# Patient Record
Sex: MALE | Race: WHITE | NOT HISPANIC OR LATINO | Employment: FULL TIME | URBAN - METROPOLITAN AREA
[De-identification: names, ages, dates, MRNs, and addresses within clinical notes are randomized per-mention and may not be internally consistent; named-entity substitution may affect disease eponyms.]

---

## 2018-07-13 ENCOUNTER — OFFICE VISIT (OUTPATIENT)
Dept: FAMILY MEDICINE CLINIC | Facility: CLINIC | Age: 28
End: 2018-07-13
Payer: COMMERCIAL

## 2018-07-13 VITALS
HEART RATE: 72 BPM | SYSTOLIC BLOOD PRESSURE: 110 MMHG | HEIGHT: 69 IN | WEIGHT: 202 LBS | BODY MASS INDEX: 29.92 KG/M2 | RESPIRATION RATE: 16 BRPM | DIASTOLIC BLOOD PRESSURE: 78 MMHG

## 2018-07-13 DIAGNOSIS — Z13.29 SCREENING FOR THYROID DISORDER: ICD-10-CM

## 2018-07-13 DIAGNOSIS — Z23 NEED FOR DIPHTHERIA-TETANUS-PERTUSSIS (TDAP) VACCINE: ICD-10-CM

## 2018-07-13 DIAGNOSIS — Z00.00 PE (PHYSICAL EXAM), ANNUAL: Primary | ICD-10-CM

## 2018-07-13 DIAGNOSIS — Z13.220 SCREENING, LIPID: ICD-10-CM

## 2018-07-13 DIAGNOSIS — Z13.228 SCREENING FOR METABOLIC DISORDER: ICD-10-CM

## 2018-07-13 DIAGNOSIS — F19.11 INTRAVENOUS DRUG ABUSE IN REMISSION (HCC): ICD-10-CM

## 2018-07-13 DIAGNOSIS — Z13.0 SCREENING, ANEMIA, DEFICIENCY, IRON: ICD-10-CM

## 2018-07-13 PROCEDURE — 99385 PREV VISIT NEW AGE 18-39: CPT | Performed by: NURSE PRACTITIONER

## 2018-07-13 PROCEDURE — 90471 IMMUNIZATION ADMIN: CPT | Performed by: NURSE PRACTITIONER

## 2018-07-13 PROCEDURE — 90715 TDAP VACCINE 7 YRS/> IM: CPT | Performed by: NURSE PRACTITIONER

## 2018-07-13 NOTE — PROGRESS NOTES
Chief Complaint   Patient presents with    Well Check     Pt here for check of 6 years,  no issue     Assessment/Plan:    1  PE (physical exam), annual   we have conducted this for you and have updated your tdap we have given you slips for blood work but ask that you check with your insurance for coverage  rto prn       Subjective:      Patient ID: Aliya Montoya is a 32 y o  male  Here today as a returning pt to establish for PE/  Is recovering IV Drug use clean now for 6 years  Has not been tested for Hep C /HIV since 1 month since being clean  Has no issues        The following portions of the patient's history were reviewed and updated as appropriate: allergies, current medications, past family history, past medical history, past social history, past surgical history and problem list     Past Medical History:   Diagnosis Date    Kidney stone     Substance abuse      Past Surgical History:   Procedure Laterality Date    KIDNEY STONE SURGERY       Family History   Problem Relation Age of Onset    Leukemia Mother     Depression Father     Mental illness Neg Hx      Social History   Social History     Social History    Marital status: Single     Spouse name: N/A    Number of children: N/A    Years of education: N/A     Occupational History    Not on file  Social History Main Topics    Smoking status: Current Some Day Smoker    Smokeless tobacco: Never Used    Alcohol use No    Drug use: No    Sexual activity: Yes     Partners: Female     Other Topics Concern    Not on file     Social History Narrative    No narrative on file     Review of Systems   Constitutional: Negative  HENT: Negative  Eyes: Negative  Respiratory: Negative  Cardiovascular: Negative  Gastrointestinal: Negative  Endocrine: Negative  Genitourinary: Negative  Musculoskeletal: Negative  Skin: Negative  Allergic/Immunologic: Negative  Neurological: Negative  Hematological: Negative  Psychiatric/Behavioral: Negative  Vitals:    07/13/18 1134   BP: 110/78   Pulse: 72   Resp: 16   Weight: 91 6 kg (202 lb)   Height: 5' 9 25" (1 759 m)       Objective: Wt Readings from Last 3 Encounters:   07/13/18 91 6 kg (202 lb)   10/18/12 77 2 kg (170 lb 4 8 oz)     BP Readings from Last 3 Encounters:   07/13/18 110/78   10/18/12 110/72     Pulse Readings from Last 3 Encounters:   07/13/18 72   10/18/12 68     BMI Readings from Last 3 Encounters:   07/13/18 29 62 kg/m²   10/18/12 24 97 kg/m²     No visits with results within 2 Year(s) from this visit  Latest known visit with results is:   Conversion Encounter Outpatient on 01/12/2015   Component Date Value Ref Range Status    Microbiology 01/12/2015 Urine Clean Cat   Final    Comment: Urine Clean Catch (Specimen)  Collected: 01/12/2015 19:43  Status: Final      Last Updated: 01/14/2015 07:21          CULT RSLT URINE (Final)      No Growth [place] Count <1000 CFU/mL  The above 1 analytes were performed by [facility]  [address] [place] [address]      Sodium 01/12/2015 139  135 - 145 mmol/L Final    Potassium 01/12/2015 4 2  3 5 - 5 0 mmol/L Final    Chloride 01/12/2015 104  100 - 108 mmol/L Final    CO2 01/12/2015 31  25 - 33 mmol/L Final    Anion Gap 01/12/2015 4  4 - 13 mmol/L Final    Total Bilirubin 01/12/2015 1 0  0 2 - 1 2 mg/dL Final    Total Protein 01/12/2015 7 4  6 4 - 8 2 g/dL Final    Alkaline Phosphatase 01/12/2015 39* 46 - 116 U/L Final    ALT 01/12/2015 122* 6 - 78 U/L Final    New Reference Range    AST 01/12/2015 47* 7 - 40 U/L Final    Glucose 01/12/2015 74  65 - 140 mg/dL Final    Comment: If patient is fasting, the ADA then defines impaired fasting glucose as  >100 mg/dl and diabetes as  >or equal to 126 mg/dl        Albumin 01/12/2015 4 1  3 5 - 5 0 g/dL Final    BUN 01/12/2015 15  10 - 26 mg/dL Final    Calcium 01/12/2015 9 5  8 3 - 10 1 mg/dL Final    Creatinine 01/12/2015 0 86  0 60 - 1 30 mg/dL Final    Standardized to IDMS reference method    AAGFR 01/12/2015 >60  ml/min/1 73sq m Final    Comment: National Kidney Disease Education Program recommendations are as  follows:  GFR calculation is accurate only with a steady state creatinine  Chronic Kidney disease less than 60 ml/min/1 73 sq  meters  Kidney failure less than 15 ml/min/1 73 sq  meters        NAAGFR 01/12/2015 >60  ml/min/1 73sq m Final    Comment: The above 17 analytes were performed by [facility] [address] WBC, UA 01/12/2015 10-20  /hpf Final    RBC, UA 01/12/2015 Field obscurred  /hpf Final    Field obscurred-unable to enumerate    Epithelial Cells 01/12/2015 None Seen   Final    Bacteria, UA 01/12/2015 None Seen   Final    Comment: The above 4 analytes were performed by [facility] [place] [place] [address]      Color, UA 01/12/2015 Red   Final    Clarity, UA 01/12/2015 Turbid   Final    Bilirubin, UA 01/12/2015 Negative  Negative Final    Specific Gravity, UA 01/12/2015 1 020  1 003 - 1 035 Final    Blood, UA 01/12/2015 Large* Negative Final    pH, UA 01/12/2015 7 0  5 0 - 8 0 Final    Urobilinogen, UA 01/12/2015 1 0  0 2,1 0 E U /dl Final    Nitrite, UA 01/12/2015 Positive* Negative Final    Leukocytes, UA 01/12/2015 Moderate* Negative Final    Comment: The above 12 analytes were performed by [facility] [place] [place] [address]      Glucose, UA 01/12/2015 Negative  Negative mg/dL Final    Ketones, UA 01/12/2015 Trace* Negative mg/dL Final    Protein, UA 01/12/2015 >=300(3+)* Negative mg/dL Final    WBC 01/12/2015 9 74  4 31 - 10 16 Thousand/uL Final    RBC 01/12/2015 4 69  3 88 - 5 62 Million/uL Final    Hemoglobin 01/12/2015 15 4  12 0 - 17 0 g/dL Final    Hematocrit 01/12/2015 42 1  36 5 - 49 3 % Final    MCV 01/12/2015 90  82 - 98 fL Final    MCH 01/12/2015 32 8  26 8 - 34 3 pg Final    MCHC 01/12/2015 36 6  31 4 - 37 4 g/dL Final    RDW 01/12/2015 12 0  11 6 - 15 1 % Final    Platelets 85/09/8112 197  149 - 390 Thousand/uL Final    Neutrophils Relative 01/12/2015 63  43 - 75 % Final    Lymphocytes Relative 01/12/2015 28  14 - 44 % Final    Monocytes Relative 01/12/2015 8  4 - 12 % Final    Eosinophils Relative 01/12/2015 1  0 - 6 % Final    Neutrophils Absolute 01/12/2015 6 09  1 85 - 7 62 Thousand/uL Final    Lymphocytes Absolute 01/12/2015 2 74  0 60 - 4 47 Thousand/uL Final    Monocytes Absolute 01/12/2015 0 80  0 17 - 1 22 Thousand/uL Final    Eosinophils Absolute 01/12/2015 0 09  0 00 - 0 61 Thousand/uL Final    Basophils Absolute 01/12/2015 0 02  0 00 - 0 10 Thousand/uL Final    Comment: The above 20 analytes were performed by [facility]  [address] [place] [address]      MPV 01/12/2015 10 7  8 9 - 12 7 fL Final          Physical Exam   Constitutional: He is oriented to person, place, and time  He appears well-developed and well-nourished  HENT:   Head: Normocephalic and atraumatic  Mouth/Throat: Oropharynx is clear and moist    Eyes: Pupils are equal, round, and reactive to light  Neck: Normal range of motion  Neck supple  Cardiovascular: Normal rate, regular rhythm, normal heart sounds and intact distal pulses  Pulmonary/Chest: Effort normal    Abdominal: Soft  Musculoskeletal: Normal range of motion  Neurological: He is alert and oriented to person, place, and time  Skin: Skin is warm and dry  Psychiatric: He has a normal mood and affect   His behavior is normal  Judgment and thought content normal

## 2018-07-24 ENCOUNTER — TELEPHONE (OUTPATIENT)
Dept: FAMILY MEDICINE CLINIC | Facility: CLINIC | Age: 28
End: 2018-07-24

## 2018-07-24 NOTE — TELEPHONE ENCOUNTER
Joaquim Burkitt called  He e-mailed you his labs because they told him they are not in network  He would like you to review and let him know the results

## 2018-08-07 DIAGNOSIS — B19.20 HEPATITIS C VIRUS INFECTION WITHOUT HEPATIC COMA, UNSPECIFIED CHRONICITY: Primary | ICD-10-CM

## 2019-08-28 ENCOUNTER — OFFICE VISIT (OUTPATIENT)
Dept: FAMILY MEDICINE CLINIC | Facility: CLINIC | Age: 29
End: 2019-08-28
Payer: COMMERCIAL

## 2019-08-28 ENCOUNTER — HOSPITAL ENCOUNTER (OUTPATIENT)
Dept: RADIOLOGY | Facility: HOSPITAL | Age: 29
Discharge: HOME | End: 2019-08-28
Payer: COMMERCIAL

## 2019-08-28 ENCOUNTER — TELEPHONE (OUTPATIENT)
Dept: UROLOGY | Facility: MEDICAL CENTER | Age: 29
End: 2019-08-28

## 2019-08-28 VITALS
HEART RATE: 70 BPM | DIASTOLIC BLOOD PRESSURE: 84 MMHG | RESPIRATION RATE: 18 BRPM | SYSTOLIC BLOOD PRESSURE: 122 MMHG | WEIGHT: 190.3 LBS | BODY MASS INDEX: 28.19 KG/M2 | TEMPERATURE: 98.4 F | HEIGHT: 69 IN

## 2019-08-28 DIAGNOSIS — R10.9 FLANK PAIN: Primary | ICD-10-CM

## 2019-08-28 DIAGNOSIS — R31.9 HEMATURIA, UNSPECIFIED TYPE: ICD-10-CM

## 2019-08-28 DIAGNOSIS — R50.9 FEVER, UNSPECIFIED FEVER CAUSE: ICD-10-CM

## 2019-08-28 DIAGNOSIS — R10.9 FLANK PAIN: ICD-10-CM

## 2019-08-28 LAB
SL AMB  POCT GLUCOSE, UA: NEGATIVE
SL AMB LEUKOCYTE ESTERASE,UA: NEGATIVE
SL AMB POCT BILIRUBIN,UA: NEGATIVE
SL AMB POCT BLOOD,UA: ABNORMAL
SL AMB POCT CLARITY,UA: ABNORMAL
SL AMB POCT COLOR,UA: YELLOW
SL AMB POCT KETONES,UA: NEGATIVE
SL AMB POCT NITRITE,UA: NEGATIVE
SL AMB POCT PH,UA: 5
SL AMB POCT SPECIFIC GRAVITY,UA: 1.01
SL AMB POCT URINE PROTEIN: NEGATIVE
SL AMB POCT UROBILINOGEN: 0.2

## 2019-08-28 PROCEDURE — 81002 URINALYSIS NONAUTO W/O SCOPE: CPT | Performed by: PHYSICIAN ASSISTANT

## 2019-08-28 PROCEDURE — 99214 OFFICE O/P EST MOD 30 MIN: CPT | Performed by: PHYSICIAN ASSISTANT

## 2019-08-28 PROCEDURE — 74176 CT ABD & PELVIS W/O CONTRAST: CPT

## 2019-08-28 RX ORDER — CIPROFLOXACIN 500 MG/1
500 TABLET, FILM COATED ORAL EVERY 12 HOURS SCHEDULED
Qty: 20 TABLET | Refills: 0 | Status: SHIPPED | OUTPATIENT
Start: 2019-08-28 | End: 2019-09-07

## 2019-08-28 NOTE — TELEPHONE ENCOUNTER
Called Tanya Dietrich and scheduled in [facility] with Dr Barbour Current tomorrow @ 11:00, emailed new patient paperwork and directions to [email or web address]

## 2019-08-28 NOTE — TELEPHONE ENCOUNTER
Please Triage - New Patient  Urgent Appt Needed-[facility]    What is the reason for the patients appointment? [id number] Kidney Stone - PCP wants him to be seen ASAP    Do we accept the patient's insurance or is the patient Self-Pay? [facility]- referral being sent      Has the patient had any previous urologist(s)? No    Has the patients records been requested?  no    Has the patient had any outside testing done? [facility] [place]    What is the patients location preference for an office visit? [place]    Does the patient have a personal history of cancer?   No    Patient can be reached at [phone]

## 2019-08-28 NOTE — PROGRESS NOTES
Assessment/Plan:    1  Flank pain    - will do CT stone protocol STAT, if no stone consider prostatitis will send urine for culture, start cipro 1 tab twice daily and follow up with patient in 1 week  - POCT urine dip  - CT renal stone study abdomen pelvis wo contrast; Future  - Urine culture    2  Hematuria, unspecified type    - as above  - POCT urine dip  - CT renal stone study abdomen pelvis wo contrast; Future  - Urine culture    3  Fever, unspecified fever cause    - stone or stone with infection? Will do CT, send urine for culture, if no stone will start cipro twice daily  - POCT urine dip  - CT renal stone study abdomen pelvis wo contrast; Future  - Urine culture    4  BMI 28 0-28 9,adult    - encouraged patient to work on [employer], exercise  and adequate sleep for weight loss  Follow up if more help is needed    F/u as needed    Subjective:   Chief Complaint   Patient presents with    Fever     102 8 on monday- and last night     Abdominal Pain     for the past 4 days    polyruria     for the past week       Patient ID: Kofi Calabrese is a 25 y o  male  Past 3 days of fever 100-102 8, urinary frequency, lower abdominal pressure comes and goes  With laying flat will get a shooting pain above hip  Has been waking up at least twice to urinate, very uncommon for him  Moving bowels a little looser then normal, normal appetite  Went to Urgent Care Monday night because of the fever, they noted blood in his urine, they sent patient to [address] [place] [address] who gave patient motrin and sent him home  Last kidney 2015         The following portions of the patient's history were reviewed and updated as appropriate: allergies, current medications, past family history, past medical history, past social history, past surgical history and problem list     Past Medical History:   Diagnosis Date    Kidney stone     Substance abuse ([facility] [address] )      Past Surgical History:   Procedure Laterality Date    KIDNEY STONE SURGERY Family History   Problem Relation Age of Onset   Benson Clemons Leukemia Mother     Depression Father     Mental illness Neg Hx      Social History     Socioeconomic History    Marital status: Single     Spouse name: Not on file    Number of children: Not on file    Years of education: Not on file    Highest education level: Not on file   Occupational History    Not on file   Social Needs    Financial resource strain: Not on file    Food insecurity:     Worry: Not on file     Inability: Not on file    Transportation needs:     Medical: Not on file     Non-medical: Not on file   Tobacco Use    Smoking status: Current Some Day Smoker    Smokeless tobacco: Never Used   Substance and Sexual Activity    Alcohol use: No    Drug use: No    Sexual activity: Yes     Partners: Female   Lifestyle    Physical activity:     Days per week: Not on file     Minutes per session: Not on file    Stress: Not on file   Relationships    Social connections:     Talks on phone: Not on file     Gets together: Not on file     Attends Restorationism service: Not on file     Active member of club or organization: Not on file     Attends meetings of clubs or organizations: Not on file     Relationship status: Not on file    Intimate partner violence:     Fear of current or ex partner: Not on file     Emotionally abused: Not on file     Physically abused: Not on file     Forced sexual activity: Not on file   Other Topics Concern    Not on file   Social History Narrative    Not on file     No current outpatient medications on file  Review of Systems          Objective:    Vitals:    08/28/19 1115   BP: 122/84   BP Location: Left arm   Patient Position: Sitting   Cuff Size: Large   Pulse: 70   Resp: 18   Temp: 98 4 °F (36 9 °C)   TempSrc: Oral   Weight: 86 3 kg (190 lb 4 8 oz)   Height: 5' 9" (1 753 m)        Physical Exam   Constitutional: He is oriented to person, place, and time  He appears well-developed and well-nourished  Cardiovascular: Normal rate, regular rhythm and normal heart sounds  Pulmonary/Chest: Breath sounds normal    Abdominal: Soft  Normal appearance and bowel sounds are normal  There is tenderness in the suprapubic area  There is no rigidity, no rebound, no guarding, no CVA tenderness and no tenderness at McBurney's point  Neurological: He is alert and oriented to person, place, and time  Skin: Skin is warm  Psychiatric: He has a normal mood and affect  His behavior is normal          Office Visit on 08/28/2019   Component Date Value Ref Range Status    LEUKOCYTE ESTERASE,UA 08/28/2019 negative   Final    NITRITE,UA 08/28/2019 negative   Final    SL AMB POCT UROBILINOGEN 08/28/2019 0 2   Final    POCT URINE PROTEIN 08/28/2019 negative   Final     PH,UA 08/28/2019 5 0   Final    BLOOD,UA 08/28/2019 large   Final    SPECIFIC GRAVITY,UA 08/28/2019 1 015   Final    KETONES,UA 08/28/2019 negative   Final    BILIRUBIN,UA 08/28/2019 negative   Final    GLUCOSE, UA 08/28/2019 negative   Final     COLOR,UA 08/28/2019 yellow   Final    CLARITY,UA 08/28/2019 cloudy   Final   ]    BMI Counseling: Body mass index is 28 1 kg/m²  Discussed the patient's BMI with him  The BMI is above average  BMI counseling and education was provided to the patient  Nutrition recommendations include reducing portion sizes, decreasing overall calorie intake and 3-5 servings of fruits/vegetables daily  Exercise recommendations include moderate aerobic physical activity for 150 minutes/week

## 2019-08-29 ENCOUNTER — OFFICE VISIT (OUTPATIENT)
Dept: UROLOGY | Facility: CLINIC | Age: 29
End: 2019-08-29
Payer: COMMERCIAL

## 2019-08-29 ENCOUNTER — TELEPHONE (OUTPATIENT)
Dept: FAMILY MEDICINE CLINIC | Facility: CLINIC | Age: 29
End: 2019-08-29

## 2019-08-29 VITALS
DIASTOLIC BLOOD PRESSURE: 76 MMHG | HEART RATE: 80 BPM | BODY MASS INDEX: 28.53 KG/M2 | SYSTOLIC BLOOD PRESSURE: 124 MMHG | HEIGHT: 69 IN | WEIGHT: 192.6 LBS

## 2019-08-29 DIAGNOSIS — N20.0 CALCULUS OF KIDNEY: Primary | ICD-10-CM

## 2019-08-29 PROCEDURE — 99244 OFF/OP CNSLTJ NEW/EST MOD 40: CPT | Performed by: UROLOGY

## 2019-08-29 NOTE — PROGRESS NOTES
8/29/2019    Dex Epp Host  1990  [id number]        Assessment  Nephrolithiasis  Possible UTI/prostatitis    Plan  We discussed that his nephrolithiasis is nonobstructing and not the cause of his current symptoms  Fortunately symptoms have mostly resolved  He will continue his antibiotic therapy and await urine culture results which hopefully will be back later today or tomorrow  We are requesting urologic records including operative report, offices, and stone analysis  He may not be a candidate for ESWL depending on stone composition  There is no indication however for urgent intervention, especially with the possibility of infection actively  Will allow for resolution of current symptoms and follow up in about 1 month with KUB  Hopefully by then we will have his records  All questions answered to his satisfaction and he agrees with the plan  Patient wishes to follow up in the [facility] office  History of Present Illness  [facility] BEATRICE is a 25 y o  male with history of bilateral kidney stones treated in 1 in [place]  At the time he had bilateral stents placed and underwent shockwave lithotripsy on 2 occasions that did not work    He then underwent ureteroscopy  He was not informed of his stone analysis  Recently, he developed right lower quadrant pain and fever 102°  He also complained of abnormal urination with urinary urgency frequency and dysuria  This lasted for a few days  He went to emergency department and then to his primary care provider  Stat CT scan was performed which revealed large left lower pole nonobstructing calculus with possible fragments, no hydronephrosis or stranding  PCP placed him on Cipro and culture was sent  We reviewed the CT images in the office today  Unfortunately, urine culture still pending          AUA SYMPTOM SCORE      Most Recent Value   AUA SYMPTOM SCORE   How often have you had a sensation of not emptying your bladder completely after you finished urinating? 3   How often have you had to urinate again less than two hours after you finished urinating? 4   How often have you found you stopped and started again several times when you urinate? 2   How often have you found it difficult to postpone urination? 1   How often have you had a weak urinary stream?  4   How often have you had to push or strain to begin urination? 2   How many times did you most typically get up to urinate from the time you went to bed at night until the time you got up in the morning? 2   Quality of Life: If you were to spend the rest of your life with your urinary condition just the way it is now, how would you feel about that?  5   AUA SYMPTOM SCORE  18          Review of Systems  Review of Systems   Constitutional: Negative  HENT: Negative  Respiratory: Negative  Cardiovascular: Negative  Gastrointestinal: Negative  Genitourinary:        As per HPI   Musculoskeletal: Negative  Skin: Negative  Neurological: Negative  Hematological: Negative            Past Medical History  Past Medical History:   Diagnosis Date    Kidney stone     Substance abuse ([facility] [address] )        Past Social History  Past Surgical History:   Procedure Laterality Date    KIDNEY STONE SURGERY         Past Family History  Family History   Problem Relation Age of Onset    Leukemia Mother     Depression Father     Substance Abuse Neg Hx        Past Social history  Social History     Socioeconomic History    Marital status: Single     Spouse name: Not on file    Number of children: Not on file    Years of education: Not on file    Highest education level: Not on file   Occupational History    Not on file   Social Needs    Financial resource strain: Not on file    Food insecurity:     Worry: Not on file     Inability: Not on file    Transportation needs:     Medical: Not on file     Non-medical: Not on file   Tobacco Use    Smoking status: Former Smoker    Smokeless tobacco: Never Used   Substance and Sexual Activity    Alcohol use: No    Drug use: No    Sexual activity: Yes     Partners: Female   Lifestyle    Physical activity:     Days per week: Not on file     Minutes per session: Not on file    Stress: Not on file   Relationships    Social connections:     Talks on phone: Not on file     Gets together: Not on file     Attends Religion service: Not on file     Active member of club or organization: Not on file     Attends meetings of clubs or organizations: Not on file     Relationship status: Not on file    Intimate partner violence:     Fear of current or ex partner: Not on file     Emotionally abused: Not on file     Physically abused: Not on file     Forced sexual activity: Not on file   Other Topics Concern    Not on file   Social History Narrative    Not on file     Social History     Tobacco Use   Smoking Status Former Smoker   Smokeless Tobacco Never Used       Current Medications  Current Outpatient Medications   Medication Sig Dispense Refill    ciprofloxacin (CIPRO) 500 mg tablet Take 1 tablet (500 mg total) by mouth every 12 (twelve) hours for 10 days 20 tablet 0     No current facility-administered medications for this visit  Allergies  No Known Allergies    Past Medical History, Social History, Family History, medications and allergies were reviewed  Vitals  Vitals:    08/29/19 1102   BP: 124/76   BP Location: Left arm   Patient Position: Sitting   Cuff Size: Adult   Pulse: 80   Weight: 87 4 kg (192 lb 9 6 oz)   Height: 5' 9" (1 753 m)       Physical Exam  Physical Exam   Constitutional: He is oriented to person, place, and time  He appears well-developed and well-nourished  Cardiovascular: Normal rate  Pulmonary/Chest: Effort normal    Abdominal: Soft  Genitourinary:   Genitourinary Comments: No CVA tenderness  Musculoskeletal: Normal range of motion  Neurological: He is alert and oriented to person, place, and time     Skin: Skin is warm, dry and intact  Psychiatric: He has a normal mood and affect  Vitals reviewed          Results  No results found for: PSA  Lab Results   Component Value Date    GLUCOSE 74 01/12/2015    CALCIUM 9 5 01/12/2015     01/12/2015    K 4 2 01/12/2015    CO2 31 01/12/2015     01/12/2015    BUN 15 01/12/2015    CREATININE 0 86 01/12/2015     Lab Results   Component Value Date    WBC 9 74 01/12/2015    HGB 15 4 01/12/2015    HCT 42 1 01/12/2015    MCV 90 01/12/2015     01/12/2015

## 2019-08-29 NOTE — TELEPHONE ENCOUNTER
Eugenia from the prior [facility] department called stating where the pt went yesterday to get the CT, was not covered by his insurance  Eugenia would need you to do a peer to peer stating why he went to [facility] and not somewhere in [facility]  Eugenia is aware that there are two cards scanned into the chart, one with [facility] on the card and another with our practice  When [facility] first started the [address] Jennifer did not have the new card yet so the peer to peer is required  If you have any questions Eugenia would like you to call her at [phone]      Peer to Peer number: [phone]

## 2019-08-30 LAB
BACTERIA UR CULT: NORMAL
Lab: NO GROWTH

## 2019-09-05 NOTE — TELEPHONE ENCOUNTER
Spoke with MATTHEW, apparently it is a medical issue and he needs to call the number on the back of his card now

## 2019-09-09 NOTE — TELEPHONE ENCOUNTER
Its okay  Thank you for letting me know anyway cause the pt was confused to what he had to do when I talked to him the other day

## 2019-09-09 NOTE — TELEPHONE ENCOUNTER
I called [facility] and she stated since the insurance card had the wrong PCP on it they needed you to do a peer to peer  But since the card now has our office on it, the CT was approved  I guess it was just an issue because the card did not have our name on it

## 2019-09-09 NOTE — TELEPHONE ENCOUNTER
I am sorry I meant to complete that not send it back to you, I was talking directly with Eugenia  I apologize

## 2019-09-17 ENCOUNTER — TELEPHONE (OUTPATIENT)
Dept: UROLOGY | Facility: AMBULATORY SURGERY CENTER | Age: 29
End: 2019-09-17

## 2019-09-17 NOTE — TELEPHONE ENCOUNTER
Patient is scheduled to be seen on 10/1, and the patient is in need of a insurance referral for the visit  Thanks

## 2019-09-17 NOTE — TELEPHONE ENCOUNTER
There is a referral that is currently valid for Urology until 11/25/19  I printed from [address] [place] [address], scanned into patient's chart, entered into epic, and assigned to appointment

## 2020-12-22 ENCOUNTER — OFFICE VISIT (OUTPATIENT)
Dept: FAMILY MEDICINE CLINIC | Facility: CLINIC | Age: 30
End: 2020-12-22
Payer: COMMERCIAL

## 2020-12-22 VITALS
DIASTOLIC BLOOD PRESSURE: 80 MMHG | BODY MASS INDEX: 28.14 KG/M2 | HEART RATE: 96 BPM | HEIGHT: 69 IN | WEIGHT: 190 LBS | SYSTOLIC BLOOD PRESSURE: 110 MMHG | RESPIRATION RATE: 14 BRPM

## 2020-12-22 DIAGNOSIS — M54.41 ACUTE MIDLINE LOW BACK PAIN WITH RIGHT-SIDED SCIATICA: Primary | ICD-10-CM

## 2020-12-22 PROCEDURE — 1036F TOBACCO NON-USER: CPT | Performed by: NURSE PRACTITIONER

## 2020-12-22 PROCEDURE — 3725F SCREEN DEPRESSION PERFORMED: CPT | Performed by: NURSE PRACTITIONER

## 2020-12-22 PROCEDURE — 3008F BODY MASS INDEX DOCD: CPT | Performed by: NURSE PRACTITIONER

## 2020-12-22 PROCEDURE — 99213 OFFICE O/P EST LOW 20 MIN: CPT | Performed by: NURSE PRACTITIONER

## 2020-12-22 RX ORDER — PREDNISONE 10 MG/1
TABLET ORAL
Qty: 30 TABLET | Refills: 0 | Status: SHIPPED | OUTPATIENT
Start: 2020-12-22 | End: 2021-03-29 | Stop reason: ALTCHOICE

## 2020-12-22 RX ORDER — METHOCARBAMOL 500 MG/1
500 TABLET, FILM COATED ORAL 3 TIMES DAILY PRN
Qty: 30 TABLET | Refills: 0 | Status: SHIPPED | OUTPATIENT
Start: 2020-12-22 | End: 2021-03-29 | Stop reason: ALTCHOICE

## 2020-12-26 ENCOUNTER — NURSE TRIAGE (OUTPATIENT)
Dept: OTHER | Facility: OTHER | Age: 30
End: 2020-12-26

## 2021-03-29 ENCOUNTER — OFFICE VISIT (OUTPATIENT)
Dept: FAMILY MEDICINE CLINIC | Facility: CLINIC | Age: 31
End: 2021-03-29
Payer: COMMERCIAL

## 2021-03-29 VITALS
HEART RATE: 84 BPM | HEIGHT: 70 IN | BODY MASS INDEX: 29.49 KG/M2 | RESPIRATION RATE: 16 BRPM | WEIGHT: 206 LBS | DIASTOLIC BLOOD PRESSURE: 80 MMHG | TEMPERATURE: 98.3 F | SYSTOLIC BLOOD PRESSURE: 122 MMHG

## 2021-03-29 DIAGNOSIS — R53.83 FATIGUE, UNSPECIFIED TYPE: ICD-10-CM

## 2021-03-29 DIAGNOSIS — R23.8 EASY BRUISING: ICD-10-CM

## 2021-03-29 DIAGNOSIS — Z00.00 ANNUAL PHYSICAL EXAM: Primary | ICD-10-CM

## 2021-03-29 DIAGNOSIS — R19.7 DIARRHEA, UNSPECIFIED TYPE: ICD-10-CM

## 2021-03-29 DIAGNOSIS — H69.83 DYSFUNCTION OF BOTH EUSTACHIAN TUBES: ICD-10-CM

## 2021-03-29 DIAGNOSIS — Z13.220 SCREENING FOR LIPID DISORDERS: ICD-10-CM

## 2021-03-29 DIAGNOSIS — Z13.89 SCREENING FOR BLOOD OR PROTEIN IN URINE: ICD-10-CM

## 2021-03-29 DIAGNOSIS — Z11.4 SCREENING FOR HIV (HUMAN IMMUNODEFICIENCY VIRUS): ICD-10-CM

## 2021-03-29 PROCEDURE — 99395 PREV VISIT EST AGE 18-39: CPT | Performed by: NURSE PRACTITIONER

## 2021-03-29 PROCEDURE — 1036F TOBACCO NON-USER: CPT | Performed by: NURSE PRACTITIONER

## 2021-03-29 PROCEDURE — 3008F BODY MASS INDEX DOCD: CPT | Performed by: NURSE PRACTITIONER

## 2021-03-29 PROCEDURE — 3725F SCREEN DEPRESSION PERFORMED: CPT | Performed by: NURSE PRACTITIONER

## 2021-03-29 PROCEDURE — 99213 OFFICE O/P EST LOW 20 MIN: CPT | Performed by: NURSE PRACTITIONER

## 2021-03-29 RX ORDER — DIPHENOXYLATE HYDROCHLORIDE AND ATROPINE SULFATE 2.5; .025 MG/1; MG/1
1 TABLET ORAL DAILY
COMMUNITY
End: 2021-06-17 | Stop reason: ALTCHOICE

## 2021-03-29 NOTE — PATIENT INSTRUCTIONS
Central Scheduling: [phone] for ENT referral scheduling  Try Imodium over the counter for diarrhea  If this does not help, please let me know  We will contact you with lab results once final       Wellness Visit for Adults   AMBULATORY CARE:   A wellness visit  is when you see your healthcare provider to get screened for health problems  Your healthcare provider will also give you advice on how to stay healthy  Write down your questions so you remember to ask them  Ask your healthcare provider how often you should have a wellness visit  What happens at a wellness visit:  Your healthcare provider will ask about your health, and your family history of health problems  This includes high blood pressure, heart disease, and cancer  He or she will ask if you have symptoms that concern you, if you smoke, and about your mood  You may also be asked about your intake of medicines, supplements, food, and alcohol  Any of the following may be done:  · Your weight  will be checked  Your height may also be checked so your body mass index (BMI) can be calculated  Your BMI shows if you are at a healthy weight  · Your blood pressure  and heart rate will be checked  Your temperature may also be checked  · Blood and urine tests  may be done  Blood tests may be done to check your cholesterol levels  Abnormal cholesterol levels increase your risk for heart disease and stroke  You may also need a blood or urine test to check for diabetes if you are at increased risk  Urine tests may be done to look for signs of an infection or kidney disease  · A physical exam  includes checking your heartbeat and lungs with a stethoscope  Your healthcare provider may also check your skin to look for sun damage  · Screening tests  may be recommended  A screening test is done to check for diseases that may not cause symptoms  The screening tests you may need depend on your age, gender, family history, and lifestyle habits   For example, colorectal screening may be recommended if you are 48years old or older  Screening tests you need if you are a woman:   · A Pap smear  is used to screen for cervical cancer  Pap smears are usually done every 3 to 5 years depending on your age  You may need them more often if you have had abnormal Pap smear test results in the past  Ask your healthcare provider how often you should have a Pap smear  · A mammogram  is an x-ray of your breasts to screen for breast cancer  Experts recommend mammograms every 2 years starting at age 48 years  You may need a mammogram at age 52 years or younger if you have an increased risk for breast cancer  Talk to your healthcare provider about when you should start having mammograms and how often you need them  Vaccines you may need:   · Get an influenza vaccine  every year  The influenza vaccine protects you from the flu  Several types of viruses cause the flu  The viruses change over time, so new vaccines are made each year  · Get a tetanus-diphtheria (Td) booster vaccine  every 10 years  This vaccine protects you against tetanus and diphtheria  Tetanus is a severe infection that may cause painful muscle spasms and lockjaw  Diphtheria is a severe bacterial infection that causes a thick covering in the back of your mouth and throat  · Get a human papillomavirus (HPV) vaccine  if you are female and aged 23 to 32 or male 23 to 24 and never received it  This vaccine protects you from HPV infection  HPV is the most common infection spread by sexual contact  HPV may also cause vaginal, penile, and anal cancers  · Get a pneumococcal vaccine  if you are aged 72 years or older  The pneumococcal vaccine is an injection given to protect you from pneumococcal disease  Pneumococcal disease is an infection caused by pneumococcal bacteria  The infection may cause pneumonia, meningitis, or an ear infection      · Get a shingles vaccine  if you are 60 or older, even if you have had shingles before  The shingles vaccine is an injection to protect you from the varicella-zoster virus  This is the same virus that causes chickenpox  Shingles is a painful rash that develops in people who had chickenpox or have been exposed to the virus  How to eat healthy:  My Plate is a model for planning healthy meals  It shows the types and amounts of foods that should go on your plate  Fruits and vegetables make up about half of your plate, and grains and protein make up the other half  A serving of dairy is included on the side of your plate  The amount of calories and serving sizes you need depends on your age, gender, weight, and height  Examples of healthy foods are listed below:  · Eat a variety of vegetables  such as dark green, red, and orange vegetables  You can also include canned vegetables low in sodium (salt) and frozen vegetables without added butter or sauces  · Eat a variety of fresh fruits , canned fruit in 100% juice, frozen fruit, and dried fruit  · Include whole grains  At least half of the grains you eat should be whole grains  Examples include whole-wheat bread, wheat pasta, brown rice, and whole-grain cereals such as oatmeal     · Eat a variety of protein foods such as seafood (fish and shellfish), lean meat, and poultry without skin (turkey and chicken)  Examples of lean meats include pork leg, shoulder, or tenderloin, and beef round, sirloin, tenderloin, and extra lean ground beef  Other protein foods include eggs and egg substitutes, beans, peas, soy products, nuts, and seeds  · Choose low-fat dairy products such as skim or 1% milk or low-fat yogurt, cheese, and cottage cheese  · Limit unhealthy fats  such as butter, hard margarine, and shortening  Exercise:  Exercise at least 30 minutes per day on most days of the week  Some examples of exercise include walking, biking, dancing, and swimming   You can also fit in more physical activity by taking the stairs instead of the elevator or parking farther away from stores  Include muscle strengthening activities 2 days each week  Regular exercise provides many health benefits  It helps you manage your weight, and decreases your risk for type 2 diabetes, heart disease, stroke, and high blood pressure  Exercise can also help improve your mood  Ask your healthcare provider about the best exercise plan for you  General health and safety guidelines:   · Do not smoke  Nicotine and other chemicals in cigarettes and cigars can cause lung damage  Ask your healthcare provider for information if you currently smoke and need help to quit  E-cigarettes or smokeless tobacco still contain nicotine  Talk to your healthcare provider before you use these products  · Limit alcohol  A drink of alcohol is 12 ounces of beer, 5 ounces of wine, or 1½ ounces of liquor  · Lose weight, if needed  Being overweight increases your risk of certain health conditions  These include heart disease, high blood pressure, type 2 diabetes, and certain types of cancer  · Protect your skin  Do not sunbathe or use tanning beds  Use sunscreen with a SPF 15 or higher  Apply sunscreen at least 15 minutes before you go outside  Reapply sunscreen every 2 hours  Wear protective clothing, hats, and sunglasses when you are outside  · Drive safely  Always wear your seatbelt  Make sure everyone in your car wears a seatbelt  A seatbelt can save your life if you are in an accident  Do not use your cell phone when you are driving  This could distract you and cause an accident  Pull over if you need to make a call or send a text message  · Practice safe sex  Use latex condoms if are sexually active and have more than one partner  Your healthcare provider may recommend screening tests for sexually transmitted infections (STIs)  · Wear helmets, lifejackets, and protective gear    Always wear a helmet when you ride a bike or motorcycle, go skiing, or play sports that could cause a head injury  Wear protective equipment when you play sports  Wear a lifejacket when you are on a boat or doing water sports  © Copyright [address] Information is for End User's use only and may not be sold, redistributed or otherwise used for commercial purposes  All illustrations and images included in CareNotes® are the copyrighted property of [employer]  or [address] [place], [place] [address]shantanu joey   The above information is an  only  It is not intended as medical advice for individual conditions or treatments  Talk to your doctor, nurse or pharmacist before following any medical regimen to see if it is safe and effective for you

## 2021-03-29 NOTE — PROGRESS NOTES
ADULT ANNUAL PHYSICAL  [facility]    NAME: Douglas Russell Host  AGE: 26 y o  SEX: male  : 1990     DATE: 3/29/2021     Assessment and Plan:  1  Obtain fasting labs when able  2  Immunizations UTD  He can get covid vaccine once available  3  F/u in 1 year for annual physical      Problem List Items Addressed This Visit     None      Visit Diagnoses     Annual physical exam    -  Primary    BMI 29 0-29 9,adult        Fatigue, unspecified type        Relevant Orders    CBC and differential    Comprehensive metabolic panel    Ferritin    TSH, 3rd generation with Free T4 reflex    Iron    Screening for blood or protein in urine        Relevant Orders    UA (URINE) with reflex to Scope    Screening for lipid disorders        Relevant Orders    Lipid Panel with Direct LDL reflex    Screening for HIV (human immunodeficiency virus)        Relevant Orders    HIV-1 RNA, quantitative, PCR    Dysfunction of both eustachian tubes        Relevant Orders    Ambulatory Referral to Otolaryngology    Easy bruising        Relevant Orders    CBC and differential    Ferritin    Iron    Protime-INR          Immunizations and preventive care screenings were discussed with patient today  Appropriate education was printed on patient's after visit summary  Counseling:  Alcohol/drug use: discussed moderation in alcohol intake, the recommendations for healthy alcohol use, and avoidance of illicit drug use  Dental Health: discussed importance of regular tooth brushing, flossing, and dental visits  Injury prevention: discussed safety/seat belts, safety helmets, smoke detectors, carbon dioxide detectors, and smoking near bedding or upholstery  Sexual health: discussed sexually transmitted diseases, partner selection, use of condoms, avoidance of unintended pregnancy, and contraceptive alternatives    · Exercise: the importance of regular exercise/physical activity was discussed  Recommend exercise 3-5 times per week for at least 30 minutes  BMI Counseling: Body mass index is 29 98 kg/m²  The BMI is above normal  Nutrition recommendations include decreasing portion sizes, encouraging healthy choices of fruits and vegetables, decreasing fast food intake, moderation in carbohydrate intake, increasing intake of lean protein and reducing intake of saturated and trans fat  Exercise recommendations include moderate physical activity 150 minutes/week  No pharmacotherapy was ordered  Return in about 1 year (around 3/29/2022) for Annual physical      Chief Complaint:     Chief Complaint   Patient presents with    Bleeding/Bruising      History of Present Illness:     Adult Annual Physical   Patient here for a comprehensive physical exam  The patient reports no problems  Diet and Physical Activity  · Diet/Nutrition: well balanced diet, consuming 3-5 servings of fruits/vegetables daily and adequate fiber intake  · Exercise: moderate cardiovascular exercise and 3-4 times a week on average  Depression Screening  PHQ-9 Depression Screening    PHQ-9:   Frequency of the following problems over the past two weeks:      Little interest or pleasure in doing things: 0 - not at all  Feeling down, depressed, or hopeless: 0 - not at all  Trouble falling or staying asleep, or sleeping too much: 0 - not at all  Feeling tired or having little energy: 0 - not at all  Poor appetite or overeatin - not at all  Feeling bad about yourself - or that you are a failure or have let yourself or your family down: 0 - not at all  Trouble concentrating on things, such as reading the newspaper or watching television: 0 - not at all  Moving or speaking so slowly that other people could have noticed   Or the opposite - being so fidgety or restless that you have been moving around a lot more than usual: 0 - not at all  Thoughts that you would be better off dead, or of hurting yourself in some way: 0 - not at all  PHQ-2 Score: 0  PHQ-9 Score: 0       General Health  · Sleep: sleeps well and gets 4-6 hours of sleep on average  · Hearing: normal - bilateral   · Vision: no vision problems and most recent eye exam >1 year ago  · Dental: regular dental visits, brushes teeth twice daily and flosses teeth occasionally   Health  · History of STDs?: no      Review of Systems:     Review of Systems   Constitutional: Negative for chills and fever  HENT: Negative  Negative for ear pain and sore throat  Eyes: Negative  Negative for pain and visual disturbance  Respiratory: Negative  Negative for cough and shortness of breath  Cardiovascular: Negative  Negative for chest pain, palpitations and leg swelling  Gastrointestinal: Positive for diarrhea  Negative for abdominal distention, abdominal pain, blood in stool, constipation, nausea and vomiting  Genitourinary: Negative  Negative for difficulty urinating, dysuria and hematuria  Musculoskeletal: Negative  Negative for arthralgias, back pain and myalgias  Skin: Negative for color change and rash  Neurological: Negative  Negative for dizziness, seizures, syncope and headaches  Hematological: Bruises/bleeds easily (per pt)  All other systems reviewed and are negative       Past Medical History:     Past Medical History:   Diagnosis Date    Kidney stone     [facility]       Past Surgical History:     Past Surgical History:   Procedure Laterality Date    KIDNEY STONE SURGERY        Social History:     E-Cigarette/Vaping    E-Cigarette Use Never User      E-Cigarette/Vaping Substances    Nicotine No     THC No     CBD No     Flavoring No     Other No     Unknown No      Social History     Socioeconomic History    Marital status: Single     Spouse name: None    Number of children: None    Years of education: None    Highest education level: None   Occupational History    None   Social Needs  Financial resource strain: None    Food insecurity     Worry: None     Inability: None    Transportation needs     Medical: None     Non-medical: None   Tobacco Use    Smoking status: Former Smoker    Smokeless tobacco: Never Used   Substance and Sexual Activity    Alcohol use: No    Drug use: No    Sexual activity: Yes     Partners: Female   Lifestyle    Physical activity     Days per week: None     Minutes per session: None    Stress: None   Relationships    Social connections     Talks on phone: None     Gets together: None     Attends Orthodox service: None     Active member of club or organization: None     Attends meetings of clubs or organizations: None     Relationship status: None    Intimate partner violence     Fear of current or ex partner: None     Emotionally abused: None     Physically abused: None     Forced sexual activity: None   Other Topics Concern    None   Social History Narrative    None      Family History:     Family History   Problem Relation Age of Onset    Leukemia Mother     Depression Father     Substance Abuse Neg Hx     Alcohol abuse Neg Hx       Current Medications:     Current Outpatient Medications   Medication Sig Dispense Refill    multivitamin (THERAGRAN) TABS Take 1 tablet by mouth daily       No current facility-administered medications for this visit  Allergies:     No Known Allergies   Physical Exam:     /80 (BP Location: Left arm, Patient Position: Sitting, Cuff Size: Large)   Pulse 84   Temp 98 3 °F (36 8 °C) (Oral)   Resp 16   Ht 5' 9 5" (1 765 m)   Wt 93 4 kg (206 lb)   BMI 29 98 kg/m²     Physical Exam  Vitals signs and nursing note reviewed  Constitutional:       Appearance: Normal appearance  He is well-developed  HENT:      Head: Normocephalic and atraumatic  Eyes:      Conjunctiva/sclera: Conjunctivae normal    Neck:      Musculoskeletal: Normal range of motion and neck supple  Vascular: No carotid bruit  Cardiovascular:      Rate and Rhythm: Normal rate and regular rhythm  Heart sounds: No murmur  Pulmonary:      Effort: Pulmonary effort is normal  No respiratory distress  Breath sounds: Normal breath sounds  No wheezing  Abdominal:      General: Bowel sounds are normal  There is no distension  Palpations: Abdomen is soft  There is no mass  Tenderness: There is no abdominal tenderness  There is no guarding or rebound  Hernia: No hernia is present  Musculoskeletal: Normal range of motion  General: No swelling or tenderness  Lymphadenopathy:      Cervical: No cervical adenopathy  Skin:     General: Skin is warm and dry  Capillary Refill: Capillary refill takes less than 2 seconds  Coloration: Skin is not pale  Neurological:      General: No focal deficit present  Mental Status: He is alert and oriented to person, place, and time  Mental status is at baseline  Sensory: No sensory deficit  Motor: No weakness  Gait: Gait normal    Psychiatric:         Mood and Affect: Mood normal          Behavior: Behavior normal          Thought Content:  Thought content normal          Judgment: Judgment normal           Ainsley Sequeira, [address] [facility]

## 2021-03-29 NOTE — PROGRESS NOTES
Assessment/Plan:     Diagnoses and all orders for this visit:    Easy bruising  -     CBC and differential; Future  -     Ferritin; Future  -     Iron; Future  -     Protime-INR; Future  -     CBC and differential  -     Ferritin  -     Iron  -     Protime-INR    Pt has noticed this over the last 2 weeks  No injuries patient is aware of  Yellow bruise noted on pt's right wrist  No other bruising or petechiae noted on exam  Pt admits to previous bruise along the transverse line of where his underwear met right thigh area  Pt also notes his gums bleed more easily now  He is concerned and would like to have lab work to further evaluate  Labs ordered  Pt notified that we will call once lab results are final     Fatigue, unspecified type  -     CBC and differential; Future  -     Comprehensive metabolic panel; Future  -     Ferritin; Future  -     TSH, 3rd generation with Free T4 reflex; Future  -     Iron; Future  -     CBC and differential  -     Comprehensive metabolic panel  -     Ferritin  -     TSH, 3rd generation with Free T4 reflex  -     Iron         Pt unsure if this is new or not  He admits to having a  at home so his schedule has changed some  BMI 29 0-29 9,adult          Pt admits to eating well at home and eating fruits & veggies  He admits to being physically active as well  Screening for blood or protein in urine  -     UA (URINE) with reflex to Scope    We will evaluate for hematuria  Pt does have hx of kidney stones in past     Screening for lipid disorders  -     Lipid Panel with Direct LDL reflex; Future  -     Lipid Panel with Direct LDL reflex    Screening for HIV (human immunodeficiency virus)  -     HIV-1 RNA, quantitative, PCR; Future  -     HIV-1 RNA, quantitative, PCR    Dysfunction of both eustachian tubes  -     Ambulatory Referral to Otolaryngology; Future    Pt would like to be established to local ENT for chronic eustachian tube dysfunction      Diarrhea, unspecified type No weight change per pt  No N/V  Diarrhea occurrences daily with some cramping before diarrhea  He has relief of cramping after diarrhea  Has tried to cut out dairy to see if this helps, but no changes  Pt encouraged to start probiotic  He can take Imodium OTC for relief  He is encouraged to continue oral hydration  If this is unresolved in next 1-2 weeks, pt is to contact office  Other orders  -     multivitamin (THERAGRAN) TABS; Take 1 tablet by mouth daily      Pt to f/u in 1 year for annual physical or sooner PRN  Subjective:      Patient ID: Rashida Owens is a 26 y o  male  Pt presents for concerns about bruising easily for about 2 weeks  He was bit by a spider about month ago and he states ever since then his stools has been "loose" every day  Pt states he has tried to cut dairy out to see if this would help without symptom relief  He admits to having abdominal cramping with the loose stools  He denies any blood, fat, or mucus in stools  The following portions of the patient's history were reviewed and updated as appropriate: allergies, current medications, past family history, past medical history, past social history, past surgical history and problem list     Review of Systems   Constitutional: Negative  Negative for activity change, appetite change, chills, fever and unexpected weight change  HENT: Negative  Negative for ear pain and sore throat  Eyes: Negative  Negative for pain and visual disturbance  Respiratory: Negative  Negative for cough and shortness of breath  Cardiovascular: Negative  Negative for chest pain, palpitations and leg swelling  Gastrointestinal: Negative  Negative for abdominal distention, abdominal pain, anal bleeding, blood in stool, constipation, diarrhea, nausea and vomiting  Genitourinary: Negative  Negative for dysuria and hematuria  Musculoskeletal: Negative  Negative for arthralgias, back pain, joint swelling and myalgias  Skin: Negative for color change and rash  Neurological: Negative  Negative for seizures and syncope  Hematological: Bruises/bleeds easily (per pt report)  Psychiatric/Behavioral: Negative  All other systems reviewed and are negative  Objective:      /80 (BP Location: Left arm, Patient Position: Sitting, Cuff Size: Large)   Pulse 84   Temp 98 3 °F (36 8 °C) (Oral)   Resp 16   Ht 5' 9 5" (1 765 m)   Wt 93 4 kg (206 lb)   BMI 29 98 kg/m²          Physical Exam  Vitals signs reviewed  Constitutional:       General: He is not in acute distress  Appearance: Normal appearance  He is not ill-appearing  Neck:      Vascular: No carotid bruit  Cardiovascular:      Rate and Rhythm: Normal rate and regular rhythm  Pulses: Normal pulses  Heart sounds: Normal heart sounds  No murmur  Pulmonary:      Effort: Pulmonary effort is normal       Breath sounds: Normal breath sounds  No wheezing  Abdominal:      General: Abdomen is flat  Bowel sounds are normal  There is no distension  Palpations: Abdomen is soft  There is no mass  Tenderness: There is no abdominal tenderness  There is no guarding or rebound  Hernia: No hernia is present  Musculoskeletal: Normal range of motion  General: No swelling, tenderness, deformity or signs of injury  Right lower leg: No edema  Left lower leg: No edema  Lymphadenopathy:      Cervical: No cervical adenopathy  Skin:     General: Skin is warm  Capillary Refill: Capillary refill takes less than 2 seconds  Findings: Bruising (slight yellow bruising on right wrist, no other bruising or petechiae noted elsewhere) present  Neurological:      General: No focal deficit present  Mental Status: He is alert and oriented to person, place, and time  Motor: No weakness        Gait: Gait normal    Psychiatric:         Mood and Affect: Mood normal          Behavior: Behavior normal          Thought Content: Thought content normal          Judgment: Judgment normal          BMI Counseling: Body mass index is 29 98 kg/m²  The BMI is above normal  Nutrition recommendations include reducing portion sizes, decreasing overall calorie intake, 3-5 servings of fruits/vegetables daily, reducing fast food intake, consuming healthier snacks, decreasing soda and/or juice intake, moderation in carbohydrate intake, increasing intake of lean protein, reducing intake of saturated fat and trans fat and reducing intake of cholesterol  Exercise recommendations include moderate aerobic physical activity for 150 minutes/week

## 2021-03-31 LAB
ALBUMIN SERPL-MCNC: 4.9 G/DL (ref 4–5)
ALBUMIN/GLOB SERPL: 1.8 {RATIO} (ref 1.2–2.2)
ALP SERPL-CCNC: 51 IU/L (ref 39–117)
ALT SERPL-CCNC: 33 IU/L (ref 0–44)
AST SERPL-CCNC: 23 IU/L (ref 0–40)
BASOPHILS # BLD AUTO: 0 X10E3/UL (ref 0–0.2)
BASOPHILS NFR BLD AUTO: 1 %
BILIRUB SERPL-MCNC: 1 MG/DL (ref 0–1.2)
BUN SERPL-MCNC: 15 MG/DL (ref 6–20)
BUN/CREAT SERPL: 15 (ref 9–20)
CALCIUM SERPL-MCNC: 9.7 MG/DL (ref 8.7–10.2)
CHLORIDE SERPL-SCNC: 103 MMOL/L (ref 96–106)
CHOLEST SERPL-MCNC: 175 MG/DL (ref 100–199)
CO2 SERPL-SCNC: 23 MMOL/L (ref 20–29)
CREAT SERPL-MCNC: 0.99 MG/DL (ref 0.76–1.27)
EOSINOPHIL # BLD AUTO: 0 X10E3/UL (ref 0–0.4)
EOSINOPHIL NFR BLD AUTO: 1 %
ERYTHROCYTE [DISTWIDTH] IN BLOOD BY AUTOMATED COUNT: 12.4 % (ref 11.6–15.4)
FERRITIN SERPL-MCNC: 99 NG/ML (ref 30–400)
GLOBULIN SER-MCNC: 2.7 G/DL (ref 1.5–4.5)
GLUCOSE SERPL-MCNC: 92 MG/DL (ref 65–99)
HCT VFR BLD AUTO: 47.4 % (ref 37.5–51)
HDLC SERPL-MCNC: 43 MG/DL
HGB BLD-MCNC: 16.4 G/DL (ref 13–17.7)
HIV1 RNA # SERPL NAA+PROBE: <20 COPIES/ML
HIV1 RNA SERPL NAA+PROBE-LOG#: NORMAL LOG10COPY/ML
IMM GRANULOCYTES # BLD: 0 X10E3/UL (ref 0–0.1)
IMM GRANULOCYTES NFR BLD: 0 %
INR PPP: 1 (ref 0.9–1.2)
IRON SERPL-MCNC: 137 UG/DL (ref 38–169)
LDLC SERPL CALC-MCNC: 109 MG/DL (ref 0–99)
LDLC/HDLC SERPL: 2.5 RATIO (ref 0–3.6)
LYMPHOCYTES # BLD AUTO: 2.3 X10E3/UL (ref 0.7–3.1)
LYMPHOCYTES NFR BLD AUTO: 34 %
MCH RBC QN AUTO: 30.8 PG (ref 26.6–33)
MCHC RBC AUTO-ENTMCNC: 34.6 G/DL (ref 31.5–35.7)
MCV RBC AUTO: 89 FL (ref 79–97)
MONOCYTES # BLD AUTO: 0.5 X10E3/UL (ref 0.1–0.9)
MONOCYTES NFR BLD AUTO: 8 %
NEUTROPHILS # BLD AUTO: 3.8 X10E3/UL (ref 1.4–7)
NEUTROPHILS NFR BLD AUTO: 56 %
PLATELET # BLD AUTO: 282 X10E3/UL (ref 150–450)
POTASSIUM SERPL-SCNC: 3.9 MMOL/L (ref 3.5–5.2)
PROT SERPL-MCNC: 7.6 G/DL (ref 6–8.5)
PROTHROMBIN TIME: 10.5 SEC (ref 9.1–12)
RBC # BLD AUTO: 5.32 X10E6/UL (ref 4.14–5.8)
SL AMB EGFR AFRICAN AMERICAN: 116 ML/MIN/1.73
SL AMB EGFR NON AFRICAN AMERICAN: 100 ML/MIN/1.73
SL AMB VLDL CHOLESTEROL CALC: 23 MG/DL (ref 5–40)
SODIUM SERPL-SCNC: 139 MMOL/L (ref 134–144)
TRIGL SERPL-MCNC: 131 MG/DL (ref 0–149)
TSH SERPL DL<=0.005 MIU/L-ACNC: 1.53 UIU/ML (ref 0.45–4.5)
WBC # BLD AUTO: 6.6 X10E3/UL (ref 3.4–10.8)

## 2021-04-01 ENCOUNTER — TELEPHONE (OUTPATIENT)
Dept: FAMILY MEDICINE CLINIC | Facility: CLINIC | Age: 31
End: 2021-04-01

## 2021-04-01 NOTE — TELEPHONE ENCOUNTER
----- Message from Demetrio Najera, [address] sent at 4/1/2021  9:31 AM EDT -----  Can you please let pt know that all of his labs are normal  Thanks! Left detailed message to patient  ,

## 2021-04-26 ENCOUNTER — IMMUNIZATIONS (OUTPATIENT)
Dept: FAMILY MEDICINE CLINIC | Facility: HOSPITAL | Age: 31
End: 2021-04-26

## 2021-04-26 DIAGNOSIS — Z23 ENCOUNTER FOR IMMUNIZATION: Primary | ICD-10-CM

## 2021-04-26 PROCEDURE — 0001A SARS-COV-2 / COVID-19 MRNA VACCINE (PFIZER-BIONTECH) 30 MCG: CPT

## 2021-04-26 PROCEDURE — 91300 SARS-COV-2 / COVID-19 MRNA VACCINE (PFIZER-BIONTECH) 30 MCG: CPT

## 2021-05-20 ENCOUNTER — IMMUNIZATIONS (OUTPATIENT)
Dept: FAMILY MEDICINE CLINIC | Facility: HOSPITAL | Age: 31
End: 2021-05-20

## 2021-05-20 DIAGNOSIS — Z23 ENCOUNTER FOR IMMUNIZATION: Primary | ICD-10-CM

## 2021-05-20 PROCEDURE — 91300 SARS-COV-2 / COVID-19 MRNA VACCINE (PFIZER-BIONTECH) 30 MCG: CPT

## 2021-05-20 PROCEDURE — 0002A SARS-COV-2 / COVID-19 MRNA VACCINE (PFIZER-BIONTECH) 30 MCG: CPT

## 2021-06-17 ENCOUNTER — OFFICE VISIT (OUTPATIENT)
Dept: FAMILY MEDICINE CLINIC | Facility: CLINIC | Age: 31
End: 2021-06-17
Payer: COMMERCIAL

## 2021-06-17 VITALS
HEART RATE: 86 BPM | RESPIRATION RATE: 16 BRPM | BODY MASS INDEX: 29.62 KG/M2 | HEIGHT: 70 IN | WEIGHT: 206.9 LBS | DIASTOLIC BLOOD PRESSURE: 82 MMHG | SYSTOLIC BLOOD PRESSURE: 122 MMHG

## 2021-06-17 DIAGNOSIS — R06.02 SHORTNESS OF BREATH: Primary | ICD-10-CM

## 2021-06-17 DIAGNOSIS — R61 EXCESSIVE SWEATING: ICD-10-CM

## 2021-06-17 DIAGNOSIS — R07.89 ATYPICAL CHEST PAIN: ICD-10-CM

## 2021-06-17 DIAGNOSIS — M79.89 LEG SWELLING: ICD-10-CM

## 2021-06-17 PROBLEM — B18.2 CHRONIC HEPATITIS C (HCC): Status: ACTIVE | Noted: 2021-06-17

## 2021-06-17 PROBLEM — H90.3 BILATERAL SENSORINEURAL HEARING LOSS: Status: ACTIVE | Noted: 2020-06-29

## 2021-06-17 PROCEDURE — 99214 OFFICE O/P EST MOD 30 MIN: CPT | Performed by: NURSE PRACTITIONER

## 2021-06-17 PROCEDURE — 93000 ELECTROCARDIOGRAM COMPLETE: CPT | Performed by: NURSE PRACTITIONER

## 2021-06-17 RX ORDER — FLUTICASONE PROPIONATE 50 MCG
1 SPRAY, SUSPENSION (ML) NASAL AS NEEDED
COMMUNITY

## 2021-06-17 NOTE — PROGRESS NOTES
Assessment/Plan:     Diagnoses and all orders for this visit:    Shortness of breath  -     POCT ECG  -     XR chest pa & lateral; Future    EKG NSR today  CBC, CMP, TSH, Iron done on 03/29/21, WNL  We will order CXR to further evaluate  Pt notified that we will contact him once results are final  Pt urged to go to the ER if he develops chest pain or cannot catch his breath  Pt verbalizes understanding and agreement  Leg swelling    Pt states he noticed this sporadically  No swelling noted on exam today  He is on his feet throughout workday  I encouraged compression stockings and elevation of marlene LE  Excessive sweating    Unclear etiology at this time  This only occurs at night per pt  Await CXR results  Atypical chest pain    EKG NSR & normal cardiac exam today  Reproducible chest pain  Most likely musculoskeletal in nature  Ice, Tylenol or Ibuprofen PRN for pain  Other orders  -     fluticasone (FLONASE) 50 mcg/act nasal spray; 1 spray into each nostril daily      F/u will be determine by CXR findings  Pt will be notified once these are final     Subjective:      Patient ID: Annita Streeter is a 26 y o  male  He presents after noting that he has had excessive sweating at night time after his #2 Covid vaccine on 05/20  Pt admits to shortness of breath x 1 week and states it has gotten progressively worse, even this activities that are minimal physical   He notes overall fatigue and muscle aches x 1 week  He denies fever, chills, headaches, cough or URI symptoms  He states he has sporadic chest pain without activity that only last several seconds  Denies cardiac or blood clot personal and family hx  Pt also denies new stress, diet or medications            The following portions of the patient's history were reviewed and updated as appropriate: allergies, current medications, past family history, past medical history, past social history, past surgical history and problem list     Review of Systems Constitutional: Positive for fatigue  Negative for activity change, appetite change, chills and fever  HENT: Negative  Negative for congestion, ear pain, postnasal drip, rhinorrhea and sore throat  Eyes: Negative  Negative for pain and visual disturbance  Respiratory: Positive for shortness of breath (x 1 week, with minimal physical activity)  Negative for cough and wheezing  Cardiovascular: Positive for chest pain (sporadic, lasting seconds, reproducible)  Negative for palpitations and leg swelling (no swelling today)  Gastrointestinal: Negative  Negative for abdominal distention, abdominal pain, constipation, diarrhea, nausea and vomiting  Genitourinary: Negative  Negative for difficulty urinating, dysuria, frequency, hematuria and urgency  Musculoskeletal: Positive for myalgias  Negative for arthralgias and back pain  Skin: Negative for color change and rash  Neurological: Negative  Negative for dizziness, seizures, syncope and headaches  Psychiatric/Behavioral: Negative  All other systems reviewed and are negative  Objective:      /82   Pulse 86   Resp 16   Ht 5' 9 5" (1 765 m)   Wt 93 8 kg (206 lb 14 4 oz)   BMI 30 12 kg/m²          Physical Exam  Vitals reviewed  Constitutional:       General: He is not in acute distress  Appearance: Normal appearance  He is not ill-appearing  Neck:      Vascular: No carotid bruit  Cardiovascular:      Rate and Rhythm: Normal rate and regular rhythm  Pulses: Normal pulses  Heart sounds: Normal heart sounds  No murmur heard  Pulmonary:      Effort: Pulmonary effort is normal  No respiratory distress  Breath sounds: Normal breath sounds  No wheezing  Abdominal:      General: There is no distension  Palpations: Abdomen is soft  There is no mass  Tenderness: There is no abdominal tenderness  There is no guarding or rebound  Hernia: No hernia is present     Musculoskeletal: General: Normal range of motion  Cervical back: Normal range of motion  Right lower leg: No edema  Left lower leg: No edema  Lymphadenopathy:      Cervical: No cervical adenopathy  Skin:     General: Skin is warm  Capillary Refill: Capillary refill takes less than 2 seconds  Neurological:      General: No focal deficit present  Mental Status: He is alert and oriented to person, place, and time  Motor: No weakness  Gait: Gait normal    Psychiatric:         Mood and Affect: Mood normal          Behavior: Behavior normal          Thought Content:  Thought content normal          Judgment: Judgment normal

## 2022-01-17 NOTE — PROGRESS NOTES
Assessment/Plan:    No problem-specific Assessment & Plan notes found for this encounter  {Assess/PlanSmartLinks:57148}      Subjective:      Patient ID: Annita Streeter is a 33 y o  male with chronic medical problems as noted below who presents today as a new patient to establish care  Patient Active Problem List   Diagnosis    Anxiety disorder    Depression    Chronic hepatitis C ([facility] [address] )    Bilateral sensorineural hearing loss     He is requesting refill on medications  HPI    {Common ambulatory SmartLinks:28194}    Review of Systems      Objective: There were no vitals taken for this visit           Physical Exam

## 2022-01-18 ENCOUNTER — OFFICE VISIT (OUTPATIENT)
Dept: FAMILY MEDICINE CLINIC | Facility: CLINIC | Age: 32
End: 2022-01-18
Payer: COMMERCIAL

## 2022-01-18 VITALS
BODY MASS INDEX: 30.54 KG/M2 | OXYGEN SATURATION: 97 % | HEIGHT: 69 IN | DIASTOLIC BLOOD PRESSURE: 81 MMHG | WEIGHT: 206.2 LBS | SYSTOLIC BLOOD PRESSURE: 113 MMHG | TEMPERATURE: 98.4 F | HEART RATE: 92 BPM

## 2022-01-18 DIAGNOSIS — B18.2 CHRONIC HEPATITIS C WITHOUT HEPATIC COMA (HCC): ICD-10-CM

## 2022-01-18 DIAGNOSIS — N20.0 RENAL CALCULUS, LEFT: ICD-10-CM

## 2022-01-18 DIAGNOSIS — K42.9 UMBILICAL HERNIA WITHOUT OBSTRUCTION AND WITHOUT GANGRENE: Primary | ICD-10-CM

## 2022-01-18 PROBLEM — H90.3 BILATERAL SENSORINEURAL HEARING LOSS: Status: RESOLVED | Noted: 2020-06-29 | Resolved: 2022-01-18

## 2022-01-18 PROCEDURE — 1036F TOBACCO NON-USER: CPT | Performed by: FAMILY MEDICINE

## 2022-01-18 PROCEDURE — 99203 OFFICE O/P NEW LOW 30 MIN: CPT | Performed by: FAMILY MEDICINE

## 2022-01-18 PROCEDURE — 3725F SCREEN DEPRESSION PERFORMED: CPT | Performed by: FAMILY MEDICINE

## 2022-01-18 NOTE — PROGRESS NOTES
Assessment/Plan:    No problem-specific Assessment & Plan notes found for this encounter  Diagnoses and all orders for this visit:    Umbilical hernia without obstruction and without gangrene  -     Ambulatory Referral to General Surgery; Future  Referral placed to general surgery  Chronic hepatitis C without hepatic coma (HCC)  -     Hepatitis C RNA, quantitative, PCR; Future  -     Hepatitis C RNA, quantitative, PCR  Patient recalls receiving treatment through [place]  He thinks treatment was done in 2015 but I am concerned because his viral load in 2018 was [id number]  Will repeat hep C RNA quantitative and also request records from the Gastroenterologist that he saw  Renal calculus, left  Has 14 x 8 mm calculus left kidney on CT scan 8/28/19  Not causing him any pain or issues presently  Saw urology (Dr Devyn Carbone) for this on 8/29/19  "No indication for urgent intervention"        Subjective:      Patient ID: Kimberly Milelr is a 33 y o  male who is here today as a new patient to establish care  Has history of Hep C  States that he received treatment in [place], [place] through [facility] in 2015  He had some lab through previous PCP office in august of 2018  Hep c viral load was [id number]  He also had abdominal ultrasound done in august of 2018 which showed hepatic steatosis  He is complaining of a pain around belly button area 6 days ago  Painful at times  No injury that he recalls  No associated nausea or vomiting  No change in bowel habits  HPI    The following portions of the patient's history were reviewed and updated as appropriate:   He  has a past medical history of Anxiety disorder (10/17/2012), Depression (10/17/2012), Kidney stone, and Substance abuse ([facility] [address] )  He  has a past surgical history that includes Cystoscopy w/ ureteral stent placement (2012)  He  reports that he quit smoking about 6 years ago  His smoking use included cigarettes   He has a 10 00 pack-year smoking history  He has never used smokeless tobacco  He reports previous alcohol use  He reports previous drug use  Current Outpatient Medications on File Prior to Visit   Medication Sig    fluticasone (FLONASE) 50 mcg/act nasal spray 1 spray into each nostril daily     No current facility-administered medications on file prior to visit  He is allergic to other       Review of Systems      Objective:      /81   Pulse 92   Temp 98 4 °F (36 9 °C)   Ht 5' 9" (1 753 m)   Wt 93 5 kg (206 lb 3 2 oz)   SpO2 97%   BMI 30 45 kg/m²          Physical Exam  Vitals and nursing note reviewed  Constitutional:       General: He is not in acute distress  Appearance: Normal appearance  He is not ill-appearing, toxic-appearing or diaphoretic  HENT:      Head: Normocephalic and atraumatic  Cardiovascular:      Rate and Rhythm: Normal rate and regular rhythm  Heart sounds: No murmur heard  Pulmonary:      Effort: Pulmonary effort is normal       Breath sounds: Normal breath sounds  Abdominal:      General: Bowel sounds are normal  There is no distension  Palpations: Abdomen is soft  There is no mass  Tenderness: There is abdominal tenderness (around the umbilicus)  There is no guarding or rebound  Hernia: A hernia (umbilical, reducible) is present  Musculoskeletal:      Cervical back: Normal range of motion  Right lower leg: No edema  Left lower leg: No edema  Lymphadenopathy:      Cervical: No cervical adenopathy  Neurological:      Mental Status: He is alert     Psychiatric:         Mood and Affect: Mood normal

## 2022-01-18 NOTE — PROGRESS NOTES
[facility]    NAME: Noy Murrell Host  AGE: 33 y o  SEX: male  : 1990     DATE: 2022     Assessment and Plan:     Problem List Items Addressed This Visit        Digestive    Chronic hepatitis C ([facility] [address] ) - Primary      Other Visit Diagnoses     Annual physical exam              Immunizations and preventive care screenings were discussed with patient today  Appropriate education was printed on patient's after visit summary  Counseling:  · {Annual Physical; Counselin}         No follow-ups on file  Chief Complaint:     Chief Complaint   Patient presents with    New Patient Visit     establish care, hernio,also has a lump in belly button spoted it on Thursday night  History of Present Illness:     Adult Annual Physical   Patient here for a comprehensive physical exam  The patient reports {problems:30438}  Diet and Physical Activity  · Diet/Nutrition: {annual physical; diet:09112069}  · Exercise: {annual physical; exercise:39949300}  Depression Screening  PHQ-2/9 Depression Screening    Little interest or pleasure in doing things: 3 - nearly every day  Feeling down, depressed, or hopeless: 0 - not at all  Trouble falling or staying asleep, or sleeping too much: 1 - several days  Feeling tired or having little energy: 1 - several days  Poor appetite or overeatin - not at all  Feeling bad about yourself - or that you are a failure or have let yourself or your family down: 0 - not at all  Trouble concentrating on things, such as reading the newspaper or watching television: 0 - not at all  Moving or speaking so slowly that other people could have noticed   Or the opposite - being so fidgety or restless that you have been moving around a lot more than usual: 0 - not at all  Thoughts that you would be better off dead, or of hurting yourself in some way: 0 - not at all  PHQ-9 Score: 5   PHQ-9 Interpretation: Mild depression        General Health  · Sleep: {annual physical; sleep:2102}  · Hearing: {annual physical; hearin}  · Vision: {annual physical; vision:}  · Dental: {annual physical; dental:}   Health  · History of STDs? : {yes/no:79390}  Review of Systems:     Review of Systems   Past Medical History:     Past Medical History:   Diagnosis Date    Kidney stone     Substance abuse ([facility] [address] )       Past Surgical History:     Past Surgical History:   Procedure Laterality Date    KIDNEY STONE SURGERY        Social History:     Social History     Socioeconomic History    Marital status: Single     Spouse name: None    Number of children: None    Years of education: None    Highest education level: None   Occupational History    None   Tobacco Use    Smoking status: Former Smoker     Quit date:      Years since quittin 0    Smokeless tobacco: Never Used   Vaping Use    Vaping Use: Never used   Substance and Sexual Activity    Alcohol use: Not Currently    Drug use: Not Currently    Sexual activity: Yes     Partners: Female   Other Topics Concern    None   Social History Narrative    None     Social Determinants of Health     Financial Resource Strain: Not on file   Food Insecurity: Not on file   Transportation Needs: Not on file   Physical Activity: Not on file   Stress: Not on file   Social Connections: Not on file   Intimate Partner Violence: Not on file   Housing Stability: Not on file      Family History:     Family History   Problem Relation Age of Onset    Leukemia Mother     Depression Father     Lupus Maternal Aunt     Lupus Cousin     Substance Abuse Neg Hx     Alcohol abuse Neg Hx       Current Medications:     Current Outpatient Medications   Medication Sig Dispense Refill    fluticasone (FLONASE) 50 mcg/act nasal spray 1 spray into each nostril daily       No current facility-administered medications for this visit  Allergies:      Allergies   Allergen Reactions    Other Allergic Rhinitis     Seasonal        Physical Exam:     /81   Pulse 92   Temp 98 4 °F (36 9 °C)   Ht 5' 9" (1 753 m)   Wt 93 5 kg (206 lb 3 2 oz)   SpO2 97%   BMI 30 45 kg/m²     Physical Exam     DO Aayush Poole [address] [facility]

## 2022-01-27 ENCOUNTER — TELEPHONE (OUTPATIENT)
Dept: FAMILY MEDICINE CLINIC | Facility: CLINIC | Age: 32
End: 2022-01-27

## 2022-01-27 ENCOUNTER — CONSULT (OUTPATIENT)
Dept: SURGERY | Facility: HOSPITAL | Age: 32
End: 2022-01-27
Payer: COMMERCIAL

## 2022-01-27 VITALS
TEMPERATURE: 98.7 F | HEART RATE: 105 BPM | RESPIRATION RATE: 16 BRPM | SYSTOLIC BLOOD PRESSURE: 102 MMHG | HEIGHT: 69 IN | BODY MASS INDEX: 31.37 KG/M2 | DIASTOLIC BLOOD PRESSURE: 88 MMHG | WEIGHT: 211.8 LBS

## 2022-01-27 DIAGNOSIS — K42.9 UMBILICAL HERNIA WITHOUT OBSTRUCTION AND WITHOUT GANGRENE: Primary | ICD-10-CM

## 2022-01-27 PROCEDURE — 99243 OFF/OP CNSLTJ NEW/EST LOW 30: CPT | Performed by: SURGERY

## 2022-01-27 PROCEDURE — 3008F BODY MASS INDEX DOCD: CPT | Performed by: FAMILY MEDICINE

## 2022-01-27 NOTE — TELEPHONE ENCOUNTER
Patient also needs a referral to [id number]    Info as follows:   Open Umbilical  ICD 10 X35 9  CPT 23691  Date of surgery 2/23/2022

## 2022-01-27 NOTE — TELEPHONE ENCOUNTER
Spoke with Melba Colindres from [employer] needs referral--  Diagnosis Code: K42 9  Procedure Code: [id number]  NPI: [id number]  Date of Service: 01/27/2022    Steve Hines is not currently in status for [facility]-will complete when available

## 2022-02-04 LAB
ALBUMIN SERPL-MCNC: 5.1 G/DL (ref 4–5)
ALBUMIN/GLOB SERPL: 2 {RATIO} (ref 1.2–2.2)
ALP SERPL-CCNC: 48 IU/L (ref 44–121)
ALT SERPL-CCNC: 61 IU/L (ref 0–44)
AST SERPL-CCNC: 29 IU/L (ref 0–40)
BASOPHILS # BLD AUTO: 0 X10E3/UL (ref 0–0.2)
BASOPHILS NFR BLD AUTO: 0 %
BILIRUB SERPL-MCNC: 1.1 MG/DL (ref 0–1.2)
BUN SERPL-MCNC: 14 MG/DL (ref 6–20)
BUN/CREAT SERPL: 15 (ref 9–20)
CALCIUM SERPL-MCNC: 9.7 MG/DL (ref 8.7–10.2)
CHLORIDE SERPL-SCNC: 100 MMOL/L (ref 96–106)
CO2 SERPL-SCNC: 19 MMOL/L (ref 20–29)
CREAT SERPL-MCNC: 0.96 MG/DL (ref 0.76–1.27)
EOSINOPHIL # BLD AUTO: 0.1 X10E3/UL (ref 0–0.4)
EOSINOPHIL NFR BLD AUTO: 1 %
ERYTHROCYTE [DISTWIDTH] IN BLOOD BY AUTOMATED COUNT: 12.5 % (ref 11.6–15.4)
GLOBULIN SER-MCNC: 2.5 G/DL (ref 1.5–4.5)
GLUCOSE SERPL-MCNC: 100 MG/DL (ref 65–99)
HCT VFR BLD AUTO: 47.5 % (ref 37.5–51)
HGB BLD-MCNC: 15.6 G/DL (ref 13–17.7)
IMM GRANULOCYTES # BLD: 0 X10E3/UL (ref 0–0.1)
IMM GRANULOCYTES NFR BLD: 0 %
LYMPHOCYTES # BLD AUTO: 2.3 X10E3/UL (ref 0.7–3.1)
LYMPHOCYTES NFR BLD AUTO: 33 %
MCH RBC QN AUTO: 28.8 PG (ref 26.6–33)
MCHC RBC AUTO-ENTMCNC: 32.8 G/DL (ref 31.5–35.7)
MCV RBC AUTO: 88 FL (ref 79–97)
MONOCYTES # BLD AUTO: 0.6 X10E3/UL (ref 0.1–0.9)
MONOCYTES NFR BLD AUTO: 8 %
NEUTROPHILS # BLD AUTO: 4 X10E3/UL (ref 1.4–7)
NEUTROPHILS NFR BLD AUTO: 58 %
PLATELET # BLD AUTO: 247 X10E3/UL (ref 150–450)
POTASSIUM SERPL-SCNC: 4.1 MMOL/L (ref 3.5–5.2)
PROT SERPL-MCNC: 7.6 G/DL (ref 6–8.5)
RBC # BLD AUTO: 5.41 X10E6/UL (ref 4.14–5.8)
SL AMB EGFR AFRICAN AMERICAN: 120 ML/MIN/1.73
SL AMB EGFR NON AFRICAN AMERICAN: 103 ML/MIN/1.73
SODIUM SERPL-SCNC: 139 MMOL/L (ref 134–144)
WBC # BLD AUTO: 6.9 X10E3/UL (ref 3.4–10.8)

## 2022-02-04 RX ORDER — SODIUM CHLORIDE, SODIUM LACTATE, POTASSIUM CHLORIDE, CALCIUM CHLORIDE 600; 310; 30; 20 MG/100ML; MG/100ML; MG/100ML; MG/100ML
125 INJECTION, SOLUTION INTRAVENOUS CONTINUOUS
Status: CANCELLED | OUTPATIENT
Start: 2022-02-23

## 2022-02-04 RX ORDER — CEFAZOLIN SODIUM 2 G/50ML
2000 SOLUTION INTRAVENOUS ONCE
Status: CANCELLED | OUTPATIENT
Start: 2022-02-23 | End: 2022-02-23

## 2022-02-04 NOTE — PROGRESS NOTES
Assessment/Plan:   Yari Novak is a 33 y o male who is here for Hernia (Umbilical hernia  New patient referred by his PCP for evaluation of protruding umbilcal hernia noted on 1/18/22 visit  Denies pain  Only has "discomfort" at site  No n/v/f/c  Urinating normally  Does have some constipation at times )    Plan: Umbilical hernia - discussed operative vs conservative mgt, surgical approaches, risks and benefits and patient has decided to proceed with open umbilical inguinal hernia repair  I will schedule at their earliest convenience  Preoperative Clearance: None    HPI:  Yari Novak is a 33 y o male who was referred for evaluation of Hernia (Umbilical hernia  New patient referred by his PCP for evaluation of protruding umbilcal hernia noted on 1/18/22 visit  Denies pain  Only has "discomfort" at site  No n/v/f/c  Urinating normally  Does have some constipation at times )    Currently having pain and lump       ROS:  General ROS: negative  negative for - chills, fatigue, fever or night sweats, weight loss  Respiratory ROS: no cough, shortness of breath, or wheezing  Cardiovascular ROS: no chest pain or dyspnea on exertion  Genito-Urinary ROS: no dysuria, trouble voiding, or hematuria  Musculoskeletal ROS: negative for - gait disturbance, joint pain or muscle pain  Neurological ROS: no TIA or stroke symptoms  abdominal pain    [unfilled]  Other    Current Outpatient Medications:     fluticasone (FLONASE) 50 mcg/act nasal spray, 1 spray into each nostril daily, Disp: , Rfl:   Past Medical History:   Diagnosis Date    Anxiety disorder 10/17/2012    COVID-19 01/06/2022    Depression 10/17/2012    situational  around mother's death    History of hepatitis C     Kidney stone     Substance abuse ([facility] [address] )     clean since 2015; pain killers and heroin    Umbilical pain      Past Surgical History:   Procedure Laterality Date    CYSTOSCOPY W/ URETERAL STENT PLACEMENT  2012    [facility]     Family History Problem Relation Age of Onset    Leukemia Mother     Depression Father     Lupus Maternal Aunt     Lupus Cousin     Substance Abuse Neg Hx     Alcohol abuse Neg Hx       reports that he quit smoking about 6 years ago  His smoking use included cigarettes  He has a 10 00 pack-year smoking history  He has never used smokeless tobacco  He reports previous alcohol use  He reports previous drug use  Labs:   Lab Results   Component Value Date    WBC 6 6 03/29/2021    WBC 9 74 01/12/2015    WBC 10 21 (H) 12/18/2014    HGB 16 4 03/29/2021    HGB 15 4 01/12/2015    HGB 15 6 12/18/2014     03/29/2021     01/12/2015     12/18/2014     Lab Results   Component Value Date    ALT 33 03/29/2021     (H) 01/12/2015     (H) 12/18/2014    AST 23 03/29/2021    AST 47 (H) 01/12/2015    AST 50 (H) 12/18/2014     This SmartLink has not been configured with any valid records  PHYSICAL EXAM  General Appearance:    Alert, cooperative, no distress,    Head:    Normocephalic without obvious abnormality   Eyes:    PERRL, conjunctiva/corneas clear, EOM's intact        Neck:   Supple, no adenopathy, no JVD   Back:     Symmetric, no spinal or CVA tenderness   Lungs:     Clear to auscultation bilaterally, no wheezing or rhonchi   Heart:    Regular rate and rhythm, S1 and S2 normal, no murmur   Abdomen:     Soft +BS ND NT + umbilical hernia reducible   Extremities:   Extremities normal  No clubbing, cyanosis or edema   Psych:   Normal Affect, AOx3  Neurologic:  Skin:   CNII-XII intact  Strength symmetric, speech intact    Warm, dry, intact, no visible rashes or lesions         Physical Exam              Some portions of this record may have been generated with voice recognition software  There may be translation, syntax,  or grammatical errors  Occasional wrong word or "sound-a-like" substitutions may have occurred due to the inherent limitations of the voice recognition software   Read the chart carefully and recognize, using context, where substitutions may have occurred  If you have any questions, please contact the dictating provider for clarification or correction, as needed  This encounter has been coded by a non-certified coder

## 2022-02-07 PROBLEM — R73.01 IMPAIRED FASTING GLUCOSE: Status: ACTIVE | Noted: 2022-02-07

## 2022-02-07 PROBLEM — R74.01 TRANSAMINASEMIA: Status: ACTIVE | Noted: 2022-02-07

## 2022-02-07 LAB
HCV RNA SERPL NAA+PROBE-ACNC: NORMAL IU/ML
TEST INFORMATION: NORMAL

## 2022-02-14 ENCOUNTER — APPOINTMENT (OUTPATIENT)
Dept: PREADMISSION TESTING | Facility: HOSPITAL | Age: 32
End: 2022-02-14
Payer: COMMERCIAL

## 2022-02-18 NOTE — PRE-PROCEDURE INSTRUCTIONS
Pre-Surgery Instructions:   Medication Instructions    fluticasone (FLONASE) 50 mcg/act nasal spray Continue using as ordered, may use DOS  Covid screening negative as per patient  Reviewed with patient via phone all medication instructions  Advised not to take any NSAID's, Vitamins or Herbal products prior to the DOS  Acetaminophen products are ok to take  Reviewed showering instructions as given by surgical office  Instructed to call office with any questions or concerns  Instructed about NPO after midnight the night before DOS, except sips of water with allowed medications in AM on DOS  Informed about call from [facility] with the time to arrive for the scheduled surgery  Patient verbalized understanding

## 2022-02-23 ENCOUNTER — ANESTHESIA (OUTPATIENT)
Dept: PERIOP | Facility: HOSPITAL | Age: 32
End: 2022-02-23
Payer: COMMERCIAL

## 2022-02-23 ENCOUNTER — HOSPITAL ENCOUNTER (OUTPATIENT)
Facility: HOSPITAL | Age: 32
Setting detail: OUTPATIENT SURGERY
Discharge: HOME | End: 2022-02-23
Attending: SURGERY | Admitting: SURGERY
Payer: COMMERCIAL

## 2022-02-23 ENCOUNTER — ANESTHESIA EVENT (OUTPATIENT)
Dept: PERIOP | Facility: HOSPITAL | Age: 32
End: 2022-02-23
Payer: COMMERCIAL

## 2022-02-23 VITALS
DIASTOLIC BLOOD PRESSURE: 94 MMHG | BODY MASS INDEX: 30.33 KG/M2 | HEART RATE: 72 BPM | RESPIRATION RATE: 13 BRPM | HEIGHT: 69 IN | OXYGEN SATURATION: 97 % | WEIGHT: 204.81 LBS | SYSTOLIC BLOOD PRESSURE: 147 MMHG | TEMPERATURE: 98.6 F

## 2022-02-23 DIAGNOSIS — K42.0 UMBILICAL HERNIA WITH OBSTRUCTION, WITHOUT GANGRENE: Primary | ICD-10-CM

## 2022-02-23 PROCEDURE — 49585 PR REPAIR UMBILICAL HERN,5+Y/O,REDUC: CPT | Performed by: PHYSICIAN ASSISTANT

## 2022-02-23 PROCEDURE — NC001 PR NO CHARGE: Performed by: PHYSICIAN ASSISTANT

## 2022-02-23 PROCEDURE — 49585 PR REPAIR UMBILICAL HERN,5+Y/O,REDUC: CPT | Performed by: SURGERY

## 2022-02-23 RX ORDER — DEXAMETHASONE SODIUM PHOSPHATE 10 MG/ML
INJECTION, SOLUTION INTRAMUSCULAR; INTRAVENOUS AS NEEDED
Status: DISCONTINUED | OUTPATIENT
Start: 2022-02-23 | End: 2022-02-23

## 2022-02-23 RX ORDER — ONDANSETRON 2 MG/ML
4 INJECTION INTRAMUSCULAR; INTRAVENOUS ONCE AS NEEDED
Status: DISCONTINUED | OUTPATIENT
Start: 2022-02-23 | End: 2022-02-23 | Stop reason: HOSPADM

## 2022-02-23 RX ORDER — SODIUM CHLORIDE, SODIUM LACTATE, POTASSIUM CHLORIDE, CALCIUM CHLORIDE 600; 310; 30; 20 MG/100ML; MG/100ML; MG/100ML; MG/100ML
125 INJECTION, SOLUTION INTRAVENOUS CONTINUOUS
Status: DISCONTINUED | OUTPATIENT
Start: 2022-02-23 | End: 2022-02-23 | Stop reason: HOSPADM

## 2022-02-23 RX ORDER — MEPERIDINE HYDROCHLORIDE 25 MG/ML
12.5 INJECTION INTRAMUSCULAR; INTRAVENOUS; SUBCUTANEOUS
Status: DISCONTINUED | OUTPATIENT
Start: 2022-02-23 | End: 2022-02-23 | Stop reason: HOSPADM

## 2022-02-23 RX ORDER — CEFAZOLIN SODIUM 2 G/50ML
2000 SOLUTION INTRAVENOUS ONCE
Status: COMPLETED | OUTPATIENT
Start: 2022-02-23 | End: 2022-02-23

## 2022-02-23 RX ORDER — FENTANYL CITRATE/PF 50 MCG/ML
25 SYRINGE (ML) INJECTION
Status: DISCONTINUED | OUTPATIENT
Start: 2022-02-23 | End: 2022-02-23 | Stop reason: HOSPADM

## 2022-02-23 RX ORDER — TRAMADOL HYDROCHLORIDE 50 MG/1
50 TABLET ORAL EVERY 8 HOURS PRN
Qty: 15 TABLET | Refills: 0 | Status: SHIPPED | OUTPATIENT
Start: 2022-02-23 | End: 2022-02-28

## 2022-02-23 RX ORDER — MIDAZOLAM HYDROCHLORIDE 2 MG/2ML
INJECTION, SOLUTION INTRAMUSCULAR; INTRAVENOUS AS NEEDED
Status: DISCONTINUED | OUTPATIENT
Start: 2022-02-23 | End: 2022-02-23

## 2022-02-23 RX ORDER — MAGNESIUM HYDROXIDE 1200 MG/15ML
LIQUID ORAL AS NEEDED
Status: DISCONTINUED | OUTPATIENT
Start: 2022-02-23 | End: 2022-02-23 | Stop reason: HOSPADM

## 2022-02-23 RX ORDER — ONDANSETRON 2 MG/ML
INJECTION INTRAMUSCULAR; INTRAVENOUS AS NEEDED
Status: DISCONTINUED | OUTPATIENT
Start: 2022-02-23 | End: 2022-02-23

## 2022-02-23 RX ORDER — PROMETHAZINE HYDROCHLORIDE 25 MG/ML
12.5 INJECTION, SOLUTION INTRAMUSCULAR; INTRAVENOUS ONCE AS NEEDED
Status: DISCONTINUED | OUTPATIENT
Start: 2022-02-23 | End: 2022-02-23 | Stop reason: HOSPADM

## 2022-02-23 RX ORDER — FENTANYL CITRATE 50 UG/ML
INJECTION, SOLUTION INTRAMUSCULAR; INTRAVENOUS AS NEEDED
Status: DISCONTINUED | OUTPATIENT
Start: 2022-02-23 | End: 2022-02-23

## 2022-02-23 RX ORDER — TRAMADOL HYDROCHLORIDE 50 MG/1
50 TABLET ORAL EVERY 6 HOURS PRN
Status: COMPLETED | OUTPATIENT
Start: 2022-02-23 | End: 2022-02-23

## 2022-02-23 RX ORDER — LIDOCAINE HYDROCHLORIDE 10 MG/ML
INJECTION, SOLUTION EPIDURAL; INFILTRATION; INTRACAUDAL; PERINEURAL AS NEEDED
Status: DISCONTINUED | OUTPATIENT
Start: 2022-02-23 | End: 2022-02-23

## 2022-02-23 RX ORDER — PROPOFOL 10 MG/ML
INJECTION, EMULSION INTRAVENOUS AS NEEDED
Status: DISCONTINUED | OUTPATIENT
Start: 2022-02-23 | End: 2022-02-23

## 2022-02-23 RX ORDER — ALBUTEROL SULFATE 2.5 MG/3ML
2.5 SOLUTION RESPIRATORY (INHALATION) ONCE AS NEEDED
Status: DISCONTINUED | OUTPATIENT
Start: 2022-02-23 | End: 2022-02-23 | Stop reason: HOSPADM

## 2022-02-23 RX ORDER — HYDROMORPHONE HCL/PF 1 MG/ML
0.5 SYRINGE (ML) INJECTION
Status: DISCONTINUED | OUTPATIENT
Start: 2022-02-23 | End: 2022-02-23 | Stop reason: HOSPADM

## 2022-02-23 RX ADMIN — LIDOCAINE HYDROCHLORIDE 50 MG: 10 INJECTION, SOLUTION EPIDURAL; INFILTRATION; INTRACAUDAL; PERINEURAL at 11:57

## 2022-02-23 RX ADMIN — FENTANYL CITRATE 25 MCG: 50 INJECTION, SOLUTION INTRAMUSCULAR; INTRAVENOUS at 13:12

## 2022-02-23 RX ADMIN — FENTANYL CITRATE 25 MCG: 50 INJECTION, SOLUTION INTRAMUSCULAR; INTRAVENOUS at 12:15

## 2022-02-23 RX ADMIN — PROPOFOL 200 MG: 10 INJECTION, EMULSION INTRAVENOUS at 11:57

## 2022-02-23 RX ADMIN — DEXAMETHASONE SODIUM PHOSPHATE 10 MG: 10 INJECTION, SOLUTION INTRAMUSCULAR; INTRAVENOUS at 12:07

## 2022-02-23 RX ADMIN — CEFAZOLIN SODIUM 2000 MG: 2 SOLUTION INTRAVENOUS at 11:52

## 2022-02-23 RX ADMIN — ONDANSETRON 4 MG: 2 INJECTION INTRAMUSCULAR; INTRAVENOUS at 12:27

## 2022-02-23 RX ADMIN — FENTANYL CITRATE 25 MCG: 50 INJECTION, SOLUTION INTRAMUSCULAR; INTRAVENOUS at 13:17

## 2022-02-23 RX ADMIN — MIDAZOLAM 2 MG: 1 INJECTION INTRAMUSCULAR; INTRAVENOUS at 11:50

## 2022-02-23 RX ADMIN — FENTANYL CITRATE 25 MCG: 50 INJECTION, SOLUTION INTRAMUSCULAR; INTRAVENOUS at 12:30

## 2022-02-23 RX ADMIN — TRAMADOL HYDROCHLORIDE 50 MG: 50 TABLET ORAL at 13:48

## 2022-02-23 RX ADMIN — SODIUM CHLORIDE, SODIUM LACTATE, POTASSIUM CHLORIDE, AND CALCIUM CHLORIDE 125 ML/HR: .6; .31; .03; .02 INJECTION, SOLUTION INTRAVENOUS at 10:54

## 2022-02-23 RX ADMIN — FENTANYL CITRATE 50 MCG: 50 INJECTION, SOLUTION INTRAMUSCULAR; INTRAVENOUS at 12:04

## 2022-02-23 NOTE — OP NOTE
REPAIR HERNIA UMBILICAL OPEN  Postoperative Note  PATIENT NAME: Mark Antoine  : 1990  MRN: [id number]  UB OR ROOM 01    Surgery Date:     Umbilical hernia without obstruction and without gangrene [K42 9]    Post-Op Diagnosis Codes:     * Umbilical hernia without obstruction and without gangrene [K42 9]    Procedure(s):  REPAIR HERNIA UMBILICAL OPEN    Surgeon(s) and Role:     * Marivel Echavarria MD - Primary     * Laya Greene PA-C - Assisting    The Physician Assistant was medically necessary for surgical safety the case including suturing, retraction, and hemostasis  Specimens:  * No specimens in log *    Estimated Blood Loss:   Minimal    Anesthesia Type:   General     Procedure: The patient was seen in the Holding Room  The risks, benefits, complications, treatment options, and expected outcomes were discussed with the patient  The possibilities of reaction to medication, pulmonary aspiration, perforation of viscus, bleeding, recurrent infection, the need for additional procedures, failure to diagnose a condition, and creating a complication requiring transfusion or operation were discussed with the patient  The patient concurred with the proposed plan, giving informed consent  The site of surgery properly noted/marked  The patient was taken to Operating Room  A Time Out was held and the above information confirmed  The patient was prepped and draped in a sterile fashion  An additional timeout was performed  An infraumbilical incision was made, dissection carried out to the hernia sac  Hernia sac was freed of its surrounding adhesions  The hernia sac was opened and its contents reduced  Excess hernia sac was excised  The fascia was closed interrupted figure of eight 2-0 Prolene  The subcutaneous tissues were closed using 3-0 Vicryl sutures and the skin closed using a 4-0 Monocryl subcuticular stitch   The wound was dressed, placing positive pressure in the umbilicus with a gauze pack   The wound was dressed with Histacryl  The patient tolerated the procedure well  Instrument, sponge, and needle counts were correct prior to closure and at the conclusion of the case  This text is generated with voice recognition software  There may be translation, syntax,  or grammatical errors  If you have any questions, please contact the dictating provider       Complications: None    Condition: Stable to PACU    SIGNATURE: Juliana Colon MD   DATE: February 23, 2022   TIME: 12:32 PM

## 2022-02-23 NOTE — INTERVAL H&P NOTE
H&P reviewed  After examining the patient I find no changes in the patients condition since the H&P had been written      Vitals:    02/23/22 1049   BP: 147/78   Pulse: 79   Resp: 16   Temp: 97 7 °F (36 5 °C)   SpO2: 100%

## 2022-02-23 NOTE — DISCHARGE INSTRUCTIONS
[facility] Instructions  Dr Kavon Haskins MD, FACS  [phone]    1  General: You will feel pulling sensations around the wound or funny aches and pains around the incisions  This is normal  Even minor surgery is a change in your body and this is your body's way of reacting to it  If you have had abdominal surgery, it may help to support the incision with a small pillow or blanket for comfort when moving or coughing  2  Wound care:  Okay to shower  The glue will fall off over the next week or 2  Use ice for the first 5 days after surgery  Do not use for longer than 20 minutes at a time  Use ice 5 times per day  3  Water: You may shower over the wounds  Do not bathe or use a pool or hot tub until cleared by the physician  If you were discharged with a drain, make sure drain site is covered with plastic wrap before showering  4  Activity: You may go up and down stairs, walk as much as you are comfortable, but walk at least 3 times each day  If you have had abdominal surgery, do not lift anything heavier than 15 lbs for the 1st 2 weeks and 25 lbs for weeks 3 and 4      5  Diet: You may resume a regular diet  If you had a same-day surgery or overnight stay surgery, you may wish to eat lightly for a few days: soups, crackers, and sandwiches  You may resume a regular diet when ready  6  Medications: Resume all of your previous medications, unless told otherwise by the doctor  Avoid aspirin products for 2-3 days after the date of surgery  You may, at that time, begin to take them again  Use Tylenol and Ibuprofen for pain control  You may alternate these medications every 3 hours  For example: you may take Tylenol at noon, Ibuprofen at 3:00 p m , and Tylenol again at 6:00 p m , etc   You should use ice to assist with pain control as above  You do not need to take narcotic pain medication unless you are having significant pain     If you were prescribed a narcotic pain medication containing Tylenol, such as Percocet or Norco, do not use supplemental Tylenol  7  Driving: You will need someone to drive you home on the day of surgery or discharge  Do not drive or make any important decisions while on narcotic pain medication or 24 hours and after anesthesia or sedation for surgery  Generally, you may drive when your off all narcotic pain medications and you are comfortable  8  Upset Stomach: You may take Maalox, Tums, or similar items for an upset stomach  If your narcotic pain medication causes an upset stomach, do not take it on an empty stomach  Try taking it with at least some crackers or toast      9  Constipation: Patients often experience constipation after surgery  You may take over-the-counter medication for this, such as Metamucil, Senokot, Dulcolax, milk of magnesia, etc  You may take a suppository unless you have had anorectal surgery such as a procedure on your hemorrhoids  If you experience significant nausea or vomiting after abdominal surgery, call the office before trying any of these medications  10  Call the office: If you are experiencing any of the following: fevers above 101 5°, significant nausea or vomiting, if the wound develops drainage and/or there is excessive redness around the wound, or if you have significant diarrhea or other worsening symptoms  11  Pain: You may be given a prescription for pain medication  This will be sent to your pharmacy prior to discharge  12  Sexual Activity: You may resume sexual activity when you feel ready and comfortable and your incision is sealed and healed without apparent infection risk  13  Urination: If you have not urinated in 6 hours, go directly to the ER for evaluation for urinary retention  14  Follow-up in 2 weeks        ***READ ONLY IF YOU HAVE BEEN DISCHARGED WITH A URINARY CATHETER***  Gould Insertion for Post-Op Urinary Retention    - A prescription for Flomax will be sent to your pharmacy  This should be taken daily while the urinary catheter remains in place  You will not be given a prescription for Flomax if your prostate has been removed  If you are already taking Flomax, continue the medication as prescribed  - We will send a message to the urology group who will contact you within the next 48 hours with further instructions and to schedule an appointment for voiding trial and catheter removal   The urinary catheter will remain in place for approximately 1 week  If you are not contacted within the next 48 hours please call our office to assist with scheduling your follow-up      - If you have your own urologist, you should contact your physician the day after discharge for instructions and to schedule a voiding trial and catheter removal

## 2022-02-23 NOTE — ANESTHESIA PREPROCEDURE EVALUATION
Procedure:  REPAIR HERNIA UMBILICAL OPEN (N/A Abdomen)    Relevant Problems   GI/HEPATIC   (+) Chronic hepatitis C (HCC)      /RENAL   (+) Renal calculus, left        Physical Exam    Airway    Mallampati score: II  TM Distance: >3 FB  Neck ROM: full     Dental       Cardiovascular  Cardiovascular exam normal    Pulmonary  Pulmonary exam normal     Other Findings        Anesthesia Plan  ASA Score- 2     Anesthesia Type- general with ASA Monitors  Additional Monitors:   Airway Plan: LMA  Plan Factors-Exercise tolerance (METS): >4 METS  Chart reviewed  EKG reviewed  Imaging results reviewed  Existing labs reviewed  Patient summary reviewed  Patient is not a current smoker  Patient did not smoke on day of surgery  Obstructive sleep apnea risk education given perioperatively  Induction- intravenous  Postoperative Plan- Plan for postoperative opioid use  Planned trial extubation    Informed Consent- Anesthetic plan and risks discussed with patient  I personally reviewed this patient with the CRNA  Discussed and agreed on the Anesthesia Plan with the CRNA  Kvng Griffith

## 2022-02-23 NOTE — ANESTHESIA POSTPROCEDURE EVALUATION
Post-Op Assessment Note    CV Status:  Stable  Pain Score: 0    Pain management: adequate     Mental Status:  Alert and awake   Hydration Status:  Euvolemic   PONV Controlled:  Controlled   Airway Patency:  Patent      Post Op Vitals Reviewed: Yes      Staff: CRNA         No complications documented      BP (P) 131/85 (02/23/22 1245)    Temp (P) 98 7 °F (37 1 °C) (02/23/22 1245)    Pulse (P) 66 (02/23/22 1245)   Resp   15   SpO2 (P) 100 % (02/23/22 1245)

## 2022-02-28 ENCOUNTER — TELEPHONE (OUTPATIENT)
Dept: SURGERY | Facility: HOSPITAL | Age: 32
End: 2022-02-28

## 2022-02-28 NOTE — TELEPHONE ENCOUNTER
Post op call  Left message for patient  Asked how patient was doing, if there were any questions or concerns  Reminded patient of post op appointment and to call if there are any questions or concerns prior to appointment

## 2022-03-10 ENCOUNTER — OFFICE VISIT (OUTPATIENT)
Dept: SURGERY | Facility: HOSPITAL | Age: 32
End: 2022-03-10

## 2022-03-10 VITALS — RESPIRATION RATE: 16 BRPM | BODY MASS INDEX: 30.69 KG/M2 | TEMPERATURE: 97.7 F | HEIGHT: 69 IN | WEIGHT: 207.2 LBS

## 2022-03-10 DIAGNOSIS — Z09 POSTOP CHECK: Primary | ICD-10-CM

## 2022-03-10 PROCEDURE — 99024 POSTOP FOLLOW-UP VISIT: CPT | Performed by: SURGERY

## 2022-03-18 NOTE — PROGRESS NOTES
Seen and examined no acute event doing well   avss afebrile  Soft +BS ND NT incision cdi    S/p UHR  Cont light duty for two weeks, f/u prn

## 2022-03-23 ENCOUNTER — RA CDI HCC (OUTPATIENT)
Dept: OTHER | Facility: HOSPITAL | Age: 32
End: 2022-03-23

## 2022-03-23 NOTE — PROGRESS NOTES
[facility]  coding opportunities       Chart reviewed, no opportunity found: CHART REVIEWED, NO OPPORTUNITY FOUND        Patients Insurance     Medicare Insurance: [employer] [facility] Advant[employer]

## 2022-03-30 ENCOUNTER — OFFICE VISIT (OUTPATIENT)
Dept: FAMILY MEDICINE CLINIC | Facility: CLINIC | Age: 32
End: 2022-03-30
Payer: COMMERCIAL

## 2022-03-30 VITALS
SYSTOLIC BLOOD PRESSURE: 134 MMHG | BODY MASS INDEX: 31.25 KG/M2 | HEIGHT: 69 IN | TEMPERATURE: 98.6 F | OXYGEN SATURATION: 99 % | WEIGHT: 211 LBS | HEART RATE: 88 BPM | DIASTOLIC BLOOD PRESSURE: 80 MMHG

## 2022-03-30 DIAGNOSIS — Z23 ENCOUNTER FOR ADMINISTRATION OF VACCINE: ICD-10-CM

## 2022-03-30 DIAGNOSIS — D17.1 LIPOMA OF TORSO: ICD-10-CM

## 2022-03-30 DIAGNOSIS — Z00.00 ANNUAL PHYSICAL EXAM: Primary | ICD-10-CM

## 2022-03-30 DIAGNOSIS — R73.01 IMPAIRED FASTING GLUCOSE: ICD-10-CM

## 2022-03-30 DIAGNOSIS — Z86.19 HISTORY OF HEPATITIS C: ICD-10-CM

## 2022-03-30 DIAGNOSIS — R06.00 DOE (DYSPNEA ON EXERTION): ICD-10-CM

## 2022-03-30 PROCEDURE — 99214 OFFICE O/P EST MOD 30 MIN: CPT | Performed by: FAMILY MEDICINE

## 2022-03-30 PROCEDURE — 90746 HEPB VACCINE 3 DOSE ADULT IM: CPT

## 2022-03-30 PROCEDURE — 90471 IMMUNIZATION ADMIN: CPT

## 2022-03-30 PROCEDURE — 99395 PREV VISIT EST AGE 18-39: CPT | Performed by: FAMILY MEDICINE

## 2022-03-30 NOTE — PATIENT INSTRUCTIONS
Wellness Visit for Adults   AMBULATORY CARE:   A wellness visit  is when you see your healthcare provider to get screened for health problems  Your healthcare provider will also give you advice on how to stay healthy  Write down your questions so you remember to ask them  Ask your healthcare provider how often you should have a wellness visit  What happens at a wellness visit:  Your healthcare provider will ask about your health, and your family history of health problems  This includes high blood pressure, heart disease, and cancer  He or she will ask if you have symptoms that concern you, if you smoke, and about your mood  You may also be asked about your intake of medicines, supplements, food, and alcohol  Any of the following may be done:  · Your weight  will be checked  Your height may also be checked so your body mass index (BMI) can be calculated  Your BMI shows if you are at a healthy weight  · Your blood pressure  and heart rate will be checked  Your temperature may also be checked  · Blood and urine tests  may be done  Blood tests may be done to check your cholesterol levels  Abnormal cholesterol levels increase your risk for heart disease and stroke  You may also need a blood or urine test to check for diabetes if you are at increased risk  Urine tests may be done to look for signs of an infection or kidney disease  · A physical exam  includes checking your heartbeat and lungs with a stethoscope  Your healthcare provider may also check your skin to look for sun damage  · Screening tests  may be recommended  A screening test is done to check for diseases that may not cause symptoms  The screening tests you may need depend on your age, gender, family history, and lifestyle habits  For example, colorectal screening may be recommended if you are 48years old or older  Screening tests you need if you are a woman:   · A Pap smear  is used to screen for cervical cancer   Pap smears are usually done every 3 to 5 years depending on your age  You may need them more often if you have had abnormal Pap smear test results in the past  Ask your healthcare provider how often you should have a Pap smear  · A mammogram  is an x-ray of your breasts to screen for breast cancer  Experts recommend mammograms every 2 years starting at age 48 years  You may need a mammogram at age 52 years or younger if you have an increased risk for breast cancer  Talk to your healthcare provider about when you should start having mammograms and how often you need them  Vaccines you may need:   · Get an influenza vaccine  every year  The influenza vaccine protects you from the flu  Several types of viruses cause the flu  The viruses change over time, so new vaccines are made each year  · Get a tetanus-diphtheria (Td) booster vaccine  every 10 years  This vaccine protects you against tetanus and diphtheria  Tetanus is a severe infection that may cause painful muscle spasms and lockjaw  Diphtheria is a severe bacterial infection that causes a thick covering in the back of your mouth and throat  · Get a human papillomavirus (HPV) vaccine  if you are female and aged 23 to 32 or male 23 to 24 and never received it  This vaccine protects you from HPV infection  HPV is the most common infection spread by sexual contact  HPV may also cause vaginal, penile, and anal cancers  · Get a pneumococcal vaccine  if you are aged 72 years or older  The pneumococcal vaccine is an injection given to protect you from pneumococcal disease  Pneumococcal disease is an infection caused by pneumococcal bacteria  The infection may cause pneumonia, meningitis, or an ear infection  · Get a shingles vaccine  if you are 60 or older, even if you have had shingles before  The shingles vaccine is an injection to protect you from the varicella-zoster virus  This is the same virus that causes chickenpox   Shingles is a painful rash that develops in people who had chickenpox or have been exposed to the virus  How to eat healthy:  My Plate is a model for planning healthy meals  It shows the types and amounts of foods that should go on your plate  Fruits and vegetables make up about half of your plate, and grains and protein make up the other half  A serving of dairy is included on the side of your plate  The amount of calories and serving sizes you need depends on your age, gender, weight, and height  Examples of healthy foods are listed below:  · Eat a variety of vegetables  such as dark green, red, and orange vegetables  You can also include canned vegetables low in sodium (salt) and frozen vegetables without added butter or sauces  · Eat a variety of fresh fruits , canned fruit in 100% juice, frozen fruit, and dried fruit  · Include whole grains  At least half of the grains you eat should be whole grains  Examples include whole-wheat bread, wheat pasta, brown rice, and whole-grain cereals such as oatmeal     · Eat a variety of protein foods such as seafood (fish and shellfish), lean meat, and poultry without skin (turkey and chicken)  Examples of lean meats include pork leg, shoulder, or tenderloin, and beef round, sirloin, tenderloin, and extra lean ground beef  Other protein foods include eggs and egg substitutes, beans, peas, soy products, nuts, and seeds  · Choose low-fat dairy products such as skim or 1% milk or low-fat yogurt, cheese, and cottage cheese  · Limit unhealthy fats  such as butter, hard margarine, and shortening  Exercise:  Exercise at least 30 minutes per day on most days of the week  Some examples of exercise include walking, biking, dancing, and swimming  You can also fit in more physical activity by taking the stairs instead of the elevator or parking farther away from stores  Include muscle strengthening activities 2 days each week  Regular exercise provides many health benefits   It helps you manage your weight, and decreases your risk for type 2 diabetes, heart disease, stroke, and high blood pressure  Exercise can also help improve your mood  Ask your healthcare provider about the best exercise plan for you  General health and safety guidelines:   · Do not smoke  Nicotine and other chemicals in cigarettes and cigars can cause lung damage  Ask your healthcare provider for information if you currently smoke and need help to quit  E-cigarettes or smokeless tobacco still contain nicotine  Talk to your healthcare provider before you use these products  · Limit alcohol  A drink of alcohol is 12 ounces of beer, 5 ounces of wine, or 1½ ounces of liquor  · Lose weight, if needed  Being overweight increases your risk of certain health conditions  These include heart disease, high blood pressure, type 2 diabetes, and certain types of cancer  · Protect your skin  Do not sunbathe or use tanning beds  Use sunscreen with a SPF 15 or higher  Apply sunscreen at least 15 minutes before you go outside  Reapply sunscreen every 2 hours  Wear protective clothing, hats, and sunglasses when you are outside  · Drive safely  Always wear your seatbelt  Make sure everyone in your car wears a seatbelt  A seatbelt can save your life if you are in an accident  Do not use your cell phone when you are driving  This could distract you and cause an accident  Pull over if you need to make a call or send a text message  · Practice safe sex  Use latex condoms if are sexually active and have more than one partner  Your healthcare provider may recommend screening tests for sexually transmitted infections (STIs)  · Wear helmets, lifejackets, and protective gear  Always wear a helmet when you ride a bike or motorcycle, go skiing, or play sports that could cause a head injury  Wear protective equipment when you play sports  Wear a lifejacket when you are on a boat or doing water sports      © Copyright [employer] 2022 Information is for End User's use only and may not be sold, redistributed or otherwise used for commercial purposes  All illustrations and images included in CareNotes® are the copyrighted property of [employer]  or [employer]  The above information is an  only  It is not intended as medical advice for individual conditions or treatments  Talk to your doctor, nurse or pharmacist before following any medical regimen to see if it is safe and effective for you  Wellness Visit for Adults   AMBULATORY CARE:   A wellness visit  is when you see your healthcare provider to get screened for health problems  Your healthcare provider will also give you advice on how to stay healthy  Write down your questions so you remember to ask them  Ask your healthcare provider how often you should have a wellness visit  What happens at a wellness visit:  Your healthcare provider will ask about your health, and your family history of health problems  This includes high blood pressure, heart disease, and cancer  He or she will ask if you have symptoms that concern you, if you smoke, and about your mood  You may also be asked about your intake of medicines, supplements, food, and alcohol  Any of the following may be done:  · Your weight  will be checked  Your height may also be checked so your body mass index (BMI) can be calculated  Your BMI shows if you are at a healthy weight  · Your blood pressure  and heart rate will be checked  Your temperature may also be checked  · Blood and urine tests  may be done  Blood tests may be done to check your cholesterol levels  Abnormal cholesterol levels increase your risk for heart disease and stroke  You may also need a blood or urine test to check for diabetes if you are at increased risk  Urine tests may be done to look for signs of an infection or kidney disease  · A physical exam  includes checking your heartbeat and lungs with a stethoscope   Your healthcare provider may also check your skin to look for sun damage  · Screening tests  may be recommended  A screening test is done to check for diseases that may not cause symptoms  The screening tests you may need depend on your age, gender, family history, and lifestyle habits  For example, colorectal screening may be recommended if you are 48years old or older  Screening tests you need if you are a woman:   · A Pap smear  is used to screen for cervical cancer  Pap smears are usually done every 3 to 5 years depending on your age  You may need them more often if you have had abnormal Pap smear test results in the past  Ask your healthcare provider how often you should have a Pap smear  · A mammogram  is an x-ray of your breasts to screen for breast cancer  Experts recommend mammograms every 2 years starting at age 48 years  You may need a mammogram at age 52 years or younger if you have an increased risk for breast cancer  Talk to your healthcare provider about when you should start having mammograms and how often you need them  Vaccines you may need:   · Get an influenza vaccine  every year  The influenza vaccine protects you from the flu  Several types of viruses cause the flu  The viruses change over time, so new vaccines are made each year  · Get a tetanus-diphtheria (Td) booster vaccine  every 10 years  This vaccine protects you against tetanus and diphtheria  Tetanus is a severe infection that may cause painful muscle spasms and lockjaw  Diphtheria is a severe bacterial infection that causes a thick covering in the back of your mouth and throat  · Get a human papillomavirus (HPV) vaccine  if you are female and aged 23 to 32 or male 23 to 24 and never received it  This vaccine protects you from HPV infection  HPV is the most common infection spread by sexual contact  HPV may also cause vaginal, penile, and anal cancers  · Get a pneumococcal vaccine  if you are aged 72 years or older   The pneumococcal vaccine is an injection given to protect you from pneumococcal disease  Pneumococcal disease is an infection caused by pneumococcal bacteria  The infection may cause pneumonia, meningitis, or an ear infection  · Get a shingles vaccine  if you are 60 or older, even if you have had shingles before  The shingles vaccine is an injection to protect you from the varicella-zoster virus  This is the same virus that causes chickenpox  Shingles is a painful rash that develops in people who had chickenpox or have been exposed to the virus  How to eat healthy:  My Plate is a model for planning healthy meals  It shows the types and amounts of foods that should go on your plate  Fruits and vegetables make up about half of your plate, and grains and protein make up the other half  A serving of dairy is included on the side of your plate  The amount of calories and serving sizes you need depends on your age, gender, weight, and height  Examples of healthy foods are listed below:  · Eat a variety of vegetables  such as dark green, red, and orange vegetables  You can also include canned vegetables low in sodium (salt) and frozen vegetables without added butter or sauces  · Eat a variety of fresh fruits , canned fruit in 100% juice, frozen fruit, and dried fruit  · Include whole grains  At least half of the grains you eat should be whole grains  Examples include whole-wheat bread, wheat pasta, brown rice, and whole-grain cereals such as oatmeal     · Eat a variety of protein foods such as seafood (fish and shellfish), lean meat, and poultry without skin (turkey and chicken)  Examples of lean meats include pork leg, shoulder, or tenderloin, and beef round, sirloin, tenderloin, and extra lean ground beef  Other protein foods include eggs and egg substitutes, beans, peas, soy products, nuts, and seeds  · Choose low-fat dairy products such as skim or 1% milk or low-fat yogurt, cheese, and cottage cheese      · Limit unhealthy fats such as butter, hard margarine, and shortening  Exercise:  Exercise at least 30 minutes per day on most days of the week  Some examples of exercise include walking, biking, dancing, and swimming  You can also fit in more physical activity by taking the stairs instead of the elevator or parking farther away from stores  Include muscle strengthening activities 2 days each week  Regular exercise provides many health benefits  It helps you manage your weight, and decreases your risk for type 2 diabetes, heart disease, stroke, and high blood pressure  Exercise can also help improve your mood  Ask your healthcare provider about the best exercise plan for you  General health and safety guidelines:   · Do not smoke  Nicotine and other chemicals in cigarettes and cigars can cause lung damage  Ask your healthcare provider for information if you currently smoke and need help to quit  E-cigarettes or smokeless tobacco still contain nicotine  Talk to your healthcare provider before you use these products  · Limit alcohol  A drink of alcohol is 12 ounces of beer, 5 ounces of wine, or 1½ ounces of liquor  · Lose weight, if needed  Being overweight increases your risk of certain health conditions  These include heart disease, high blood pressure, type 2 diabetes, and certain types of cancer  · Protect your skin  Do not sunbathe or use tanning beds  Use sunscreen with a SPF 15 or higher  Apply sunscreen at least 15 minutes before you go outside  Reapply sunscreen every 2 hours  Wear protective clothing, hats, and sunglasses when you are outside  · Drive safely  Always wear your seatbelt  Make sure everyone in your car wears a seatbelt  A seatbelt can save your life if you are in an accident  Do not use your cell phone when you are driving  This could distract you and cause an accident  Pull over if you need to make a call or send a text message  · Practice safe sex    Use latex condoms if are sexually active and have more than one partner  Your healthcare provider may recommend screening tests for sexually transmitted infections (STIs)  · Wear helmets, lifejackets, and protective gear  Always wear a helmet when you ride a bike or motorcycle, go skiing, or play sports that could cause a head injury  Wear protective equipment when you play sports  Wear a lifejacket when you are on a boat or doing water sports  © Copyright [employer] 2022 Information is for End User's use only and may not be sold, redistributed or otherwise used for commercial purposes  All illustrations and images included in CareNotes® are the copyrighted property of [employer]  or [employer]   The above information is an  only  It is not intended as medical advice for individual conditions or treatments  Talk to your doctor, nurse or pharmacist before following any medical regimen to see if it is safe and effective for you  Obesity   AMBULATORY CARE:   Obesity  means your body mass index (BMI) is greater than 30  Your healthcare provider will use your height and weight to measure your BMI  The risks of obesity include  many health problems, including injuries or physical disability  · Diabetes (high blood sugar level)    · High blood pressure or high cholesterol    · Heart disease    · Stroke    · Gallbladder or liver disease    · Cancer of the colon, breast, prostate, liver, or kidney    · Sleep apnea    · Arthritis or gout    Screening  is done to check for health conditions before you have signs or symptoms  If you are 28to 79years old, your blood sugar level may be checked every 3 years for signs of prediabetes or diabetes  Your healthcare provider will check your blood pressure at each visit  High blood pressure can lead to a stroke or other problems  Your provider may check for signs of heart disease, cancer, or other health problems    Seek care immediately if:   · You have a severe headache, confusion, or difficulty speaking  · You have weakness on one side of your body  · You have chest pain, sweating, or shortness of breath  Call your doctor if:   · You have symptoms of gallbladder or liver disease, such as pain in your upper abdomen  · You have knee or hip pain and discomfort while walking  · You have symptoms of diabetes, such as intense hunger and thirst, and frequent urination  · You have symptoms of sleep apnea, such as snoring or daytime sleepiness  · You have questions or concerns about your condition or care  Treatment for obesity  focuses on helping you lose weight to improve your health  Even a small decrease in BMI can reduce the risk for many health problems  Your healthcare provider will help you set a weight-loss goal   · Lifestyle changes  are the first step in treating obesity  These include making healthy food choices and getting regular physical activity  Your healthcare provider may suggest a weight-loss program that involves coaching, education, and therapy  · Medicine  may help you lose weight when it is used with a healthy foods and physical activity  · Surgery  can help you lose weight if you are very obese and have other health problems  There are several types of weight-loss surgery  Ask your healthcare provider for more information  Tips for safe weight loss:   · Set small, realistic goals  An example of a small goal is to walk for 20 minutes 5 days a week  Anther goal is to lose 5% of your body weight  · Tell friends, family members, and coworkers about your goals  and ask for their support  Ask a friend to lose weight with you, or join a weight-loss support group  · Identify foods or triggers that may cause you to overeat , and find ways to avoid them  Remove tempting high-calorie foods from your home and workplace  Place a bowl of fresh fruit on your kitchen counter   If stress causes you to eat, then find other ways to cope with stress  A counselor or therapist may be able to help you  · Keep a diary to track what you eat and drink  Also write down how many minutes of physical activity you do each day  Weigh yourself once a week and record it in your diary  Eating changes: You will need to eat 500 to 1,000 fewer calories each day than you currently eat to lose 1 to 2 pounds a week  The following changes will help you cut calories:  · Eat smaller portions  Use small plates, no larger than 9 inches in diameter  Fill your plate half full of fruits and vegetables  Measure your food using measuring cups until you know what a serving size looks like  · Eat 3 meals and 1 or 2 snacks each day  Plan your meals in advance  Rizwana Bergman and eat at home most of the time  Eat slowly  Do not skip meals  Skipping meals can lead to overeating later in the day  This can make it harder for you to lose weight  Talk with a dietitian to help you make a meal plan and schedule that is right for you  · Eat fruits and vegetables at every meal   They are low in calories and high in fiber, which makes you feel full  Do not add butter, margarine, or cream sauce to vegetables  Use herbs to season steamed vegetables  · Eat less fat and fewer fried foods  Eat more baked or grilled chicken and fish  These protein sources are lower in calories and fat than red meat  Limit fast food  Dress your salads with olive oil and vinegar instead of bottled dressing  · Limit the amount of sugar you eat  Do not drink sugary beverages  Limit alcohol  Activity changes:  Physical activity is good for your body in many ways  It helps you burn calories and build strong muscles  It decreases stress and depression, and improves your mood  It can also help you sleep better  Talk to your healthcare provider before you begin an exercise program   · Exercise for at least 30 minutes 5 days a week  Start slowly   Set aside time each day for physical activity that you enjoy and that is convenient for you  It is best to do both weight training and an activity that increases your heart rate, such as walking, bicycling, or swimming  · Find ways to be more active  Do yard work and housecleaning  Walk up the stairs instead of using elevators  Spend your leisure time going to events that require walking, such as outdoor festivals or fairs  This extra physical activity can help you lose weight and keep it off  Follow up with your doctor as directed: You may need to meet with a dietitian  Write down your questions so you remember to ask them during your visits  © Copyright [employer] 2022 Information is for End User's use only and may not be sold, redistributed or otherwise used for commercial purposes  All illustrations and images included in CareNotes® are the copyrighted property of [employer]  or [employer]  The above information is an  only  It is not intended as medical advice for individual conditions or treatments  Talk to your doctor, nurse or pharmacist before following any medical regimen to see if it is safe and effective for you

## 2022-03-30 NOTE — PROGRESS NOTES
ADULT ANNUAL [facility]    NAME: Megan Chao Host  AGE: 33 y o  SEX: male  : 1990     DATE: 3/30/2022     Assessment and Plan:     Problem List Items Addressed This Visit        Digestive    History of hepatitis C  Received treatment and viral load undetectable on labs done 2022  Hepatitis B ordered today       Endocrine    Impaired fasting glucose    Relevant Orders    Comprehensive metabolic panel    HEMOGLOBIN A1C W/ EAG ESTIMATION  Reviewed labs done 22 with patient  His glucose was elevated at 100  He thinks maybe he had some coffee with creamer that AM    Will repeat cmp and do A1c in 6 months followed by appt to review results  Meanwhile advised to avoid  sweets and limit simple carbs in diet  Other Visit Diagnoses     Annual physical exam    -  Primary  Immunization History   Administered Date(s) Administered    COVID-19 PFIZER VACCINE 0 3 ML IM 2021, 2021    INFLUENZA 2014    Tdap 2018    Tuberculin Skin Test-PPD Intradermal 2011     Did not receive covid booster as he developed ALBERTS following administration of his second Nieto Peter vaccine  Declined flu vaccine this season  adacel is up to date  Hep b series started today  Discussed diet/exercise  He sees dentist      BMI 31 0-31 9,adult        ALBERTS (dyspnea on exertion)      Workup initiated by previous PCP in 2021  Cbc and tsh were normal as was ekg  Will get echo since his sx began after receiving second covid vaccine  Will also re-order CXR that he did not have completed  Call with results  Immunizations and preventive care screenings were discussed with patient today  Appropriate education was printed on patient's after visit summary  Counseling:  · Exercise: the importance of regular exercise/physical activity was discussed  Recommend exercise 3-5 times per week for at least 30 minutes  No follow-ups on file  Chief Complaint:     Chief Complaint   Patient presents with    Physical Exam      History of Present Illness:     Adult Annual Physical   Patient here for a comprehensive physical exam  The patient reports problems - ongoing shortness of breath with exertion  Chronic medical problems as noted below:  Patient Active Problem List   Diagnosis    Chronic hepatitis C (HCC)--had labs done in February of this year  Viral load undetectable   Renal calculus, left    Impaired fasting glucose--fasting glucose on recent labs was slightly elevated at 100   Transaminasemia--AST/ALT 29/61 on labs done 2/4/22  He does not drink alcohol  Did not get booster shot for covid  After second (pfizer) vaccine, had some shortness of breath  States that they did EKG in office and they ordered some labs including CBC which was normal  They also ordered a CXR which he did not go for  The shortness of breath is  Improving but still occurs when he is exerting himself (ie going up stairs chasing kids)  He is hesitant to get booster because of this  He denies any cough, chest pain associated with the ALBERTS  No associated palpitations  Does have associated lightheadedness  No formal exercise  Had hernia repair and this went well  Diet and Physical Activity  · Diet/Nutrition: well balanced diet and consuming 3-5 servings of fruits/vegetables daily  · Exercise: no formal exercise  Depression Screening  PHQ-2/9 Depression Screening    Little interest or pleasure in doing things: 0 - not at all  Feeling down, depressed, or hopeless: 0 - not at all  PHQ-2 Score: 0  PHQ-2 Interpretation: Negative depression screen       General Health  · Sleep: sleeps well  · Hearing: normal - bilateral   · Vision: no vision problems  · Dental: regular dental visits              Review of Systems:     Review of Systems   Past Medical History:     Past Medical History:   Diagnosis Date    Anxiety disorder 10/17/2012    COVID-19 2022    Depression 10/17/2012    situational  around mother's death    History of hepatitis C     Kidney stone     Substance abuse ([address] )     clean since ; pain killers and heroin    Umbilical pain       Past Surgical History:     Past Surgical History:   Procedure Laterality Date    CYSTOSCOPY W/ URETERAL STENT PLACEMENT      in [facility] [address],1+S/C,SQOEX N/A 2022    Procedure: REPAIR HERNIA UMBILICAL OPEN;  Surgeon: Salley Schaumann, MD;  Location:  MAIN OR;  Service: General    WRIST FRACTURE SURGERY Right       Social History:     Social History     Socioeconomic History    Marital status: Single     Spouse name: None    Number of children: None    Years of education: None    Highest education level: None   Occupational History    None   Tobacco Use    Smoking status: Former Smoker     Packs/day: 1 00     Years: 10 00     Pack years: 10 00     Types: Cigarettes     Quit date:      Years since quittin 2    Smokeless tobacco: Never Used   Vaping Use    Vaping Use: Never used   Substance and Sexual Activity    Alcohol use: Not Currently    Drug use: Not Currently    Sexual activity: Yes     Partners: Female   Other Topics Concern    None   Social History Narrative    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     Social Determinants of Health     Financial Resource Strain: Not on file   Food Insecurity: Not on file   Transportation Needs: Not on file   Physical Activity: Not on file   Stress: Not on file   Social Connections: Not on file   Intimate Partner Violence: Not on file   Housing Stability: Not on file      Family History:     Family History   Problem Relation Age of Onset    Leukemia Mother     Depression Father     Lupus Maternal Aunt     Lupus Cousin     Substance Abuse Neg Hx     Alcohol abuse Neg Hx       Current Medications:     Current Outpatient Medications   Medication Sig Dispense Refill    fluticasone (FLONASE) 50 mcg/act nasal spray 1 spray into each nostril as needed         No current facility-administered medications for this visit  Allergies:     No Known Allergies   Physical Exam:     /80 (BP Location: Left arm, Patient Position: Sitting, Cuff Size: Adult)   Pulse 88   Temp 98 6 °F (37 °C) (Tympanic)   Ht 5' 9" (1 753 m)   Wt 95 7 kg (211 lb)   SpO2 99%   BMI 31 16 kg/m²     Physical Exam  Vitals and nursing note reviewed  Constitutional:       General: He is not in acute distress  Appearance: Normal appearance  He is not ill-appearing, toxic-appearing or diaphoretic  HENT:      Head: Normocephalic and atraumatic  Ears:      Comments: Bilateral TM with scarring  intact     Nose: Nose normal       Mouth/Throat:      Mouth: Mucous membranes are moist       Pharynx: No oropharyngeal exudate or posterior oropharyngeal erythema  Eyes:      Extraocular Movements: Extraocular movements intact  Conjunctiva/sclera: Conjunctivae normal       Pupils: Pupils are equal, round, and reactive to light  Cardiovascular:      Rate and Rhythm: Normal rate and regular rhythm  Heart sounds: No murmur heard  No friction rub  No gallop  Pulmonary:      Effort: Pulmonary effort is normal       Breath sounds: Normal breath sounds  Abdominal:      General: Abdomen is flat  Bowel sounds are normal       Palpations: Abdomen is soft  Musculoskeletal:      Cervical back: Normal range of motion and neck supple  Right lower leg: No edema  Left lower leg: No edema  Lymphadenopathy:      Cervical: No cervical adenopathy  Skin:     Comments: subcentimeter nodular lesion right flank and another located left lower abdominal wall; non-tender, mobile   Neurological:      General: No focal deficit present  Mental Status: He is alert and oriented to person, place, and time        Deep Tendon Reflexes: Reflexes normal    Psychiatric:         Mood and Affect: Mood normal           Anthony Saldana DO   [facility]    BMI Counseling: Body mass index is 31 16 kg/m²  The BMI is above normal  Nutrition recommendations include reducing portion sizes and 3-5 servings of fruits/vegetables daily  Exercise recommendations include exercising 3-5 times per week

## 2022-04-18 ENCOUNTER — HOSPITAL ENCOUNTER (OUTPATIENT)
Dept: NON INVASIVE DIAGNOSTICS | Age: 32
Discharge: HOME | End: 2022-04-18
Payer: COMMERCIAL

## 2022-04-18 ENCOUNTER — TELEPHONE (OUTPATIENT)
Dept: FAMILY MEDICINE CLINIC | Facility: CLINIC | Age: 32
End: 2022-04-18

## 2022-04-18 VITALS
DIASTOLIC BLOOD PRESSURE: 84 MMHG | SYSTOLIC BLOOD PRESSURE: 128 MMHG | BODY MASS INDEX: 31.25 KG/M2 | HEART RATE: 73 BPM | HEIGHT: 69 IN | WEIGHT: 211 LBS

## 2022-04-18 DIAGNOSIS — R06.00 DOE (DYSPNEA ON EXERTION): ICD-10-CM

## 2022-04-18 LAB
AORTIC ROOT: 3.6 CM
APICAL FOUR CHAMBER EJECTION FRACTION: 70 %
ASCENDING AORTA: 3.8 CM (ref 2.12–3.18)
DOP CALC LVOT AREA: 3.14 CM2
DOP CALC LVOT DIAMETER: 2 CM
E WAVE DECELERATION TIME: 241 MS
FRACTIONAL SHORTENING: 40 % (ref 28–44)
INTERVENTRICULAR SEPTUM IN DIASTOLE (PARASTERNAL SHORT AXIS VIEW): 1.1 CM
INTERVENTRICULAR SEPTUM: 1.1 CM (ref 0.55–1.03)
LEFT ATRIUM AREA SYSTOLE SINGLE PLANE A4C: 12.5 CM2
LEFT ATRIUM SIZE: 3.3 CM
LEFT INTERNAL DIMENSION IN SYSTOLE: 2.9 CM (ref 3.66–5.54)
LEFT VENTRICULAR INTERNAL DIMENSION IN DIASTOLE: 4.8 CM (ref 6.06–9.03)
LEFT VENTRICULAR POSTERIOR WALL IN END DIASTOLE: 1.2 CM (ref 0.54–1.02)
LEFT VENTRICULAR STROKE VOLUME: 74 ML
LVSV (TEICH): 74 ML
MV E'TISSUE VEL-SEP: 11 CM/S
MV PEAK A VEL: 0.5 M/S
MV PEAK E VEL: 53 CM/S
MV STENOSIS PRESSURE HALF TIME: 70 MS
MV VALVE AREA P 1/2 METHOD: 3.14 CM2
PA SYSTOLIC PRESSURE: 23 MMHG
RIGHT ATRIUM AREA SYSTOLE A4C: 15.1 CM2
RIGHT VENTRICLE ID DIMENSION: 3.6 CM
SL CV LV EF: 55
SL CV PED ECHO LEFT VENTRICLE DIASTOLIC VOLUME (MOD BIPLANE) 2D: 105 ML
SL CV PED ECHO LEFT VENTRICLE SYSTOLIC VOLUME (MOD BIPLANE) 2D: 32 ML
TR MAX PG: 20 MMHG
TR PEAK VELOCITY: 2.3 M/S
TRICUSPID ANNULAR PLANE SYSTOLIC EXCURSION: 2.2 CM
TRICUSPID VALVE PEAK REGURGITATION VELOCITY: 2.25 M/S
Z-SCORE OF ASCENDING AORTA: 4.32
Z-SCORE OF INTERVENTRICULAR SEPTUM IN END DIASTOLE: 2.49
Z-SCORE OF LEFT VENTRICULAR DIMENSION IN END DIASTOLE: -4.33
Z-SCORE OF LEFT VENTRICULAR DIMENSION IN END SYSTOLE: -3.47
Z-SCORE OF LEFT VENTRICULAR POSTERIOR WALL IN END DIASTOLE: 3.41

## 2022-04-18 PROCEDURE — 93306 TTE W/DOPPLER COMPLETE: CPT

## 2022-04-18 PROCEDURE — 93306 TTE W/DOPPLER COMPLETE: CPT | Performed by: INTERNAL MEDICINE

## 2022-04-18 NOTE — TELEPHONE ENCOUNTER
----- Message from Priyank Arechiga DO sent at 4/18/2022 12:25 PM EDT -----  Please call patient to let him know that his echocardiogram looked okay  Heart muscle squeezes well

## 2022-05-03 ENCOUNTER — CLINICAL SUPPORT (OUTPATIENT)
Dept: FAMILY MEDICINE CLINIC | Facility: CLINIC | Age: 32
End: 2022-05-03
Payer: COMMERCIAL

## 2022-05-03 DIAGNOSIS — Z23 NEED FOR VACCINATION: Primary | ICD-10-CM

## 2022-05-03 PROCEDURE — 90746 HEPB VACCINE 3 DOSE ADULT IM: CPT

## 2022-05-03 PROCEDURE — 90471 IMMUNIZATION ADMIN: CPT

## 2022-05-05 ENCOUNTER — TELEPHONE (OUTPATIENT)
Dept: FAMILY MEDICINE CLINIC | Facility: CLINIC | Age: 32
End: 2022-05-05

## 2022-05-05 LAB
ALBUMIN SERPL-MCNC: 4.6 G/DL (ref 4–5)
ALBUMIN/GLOB SERPL: 1.9 {RATIO} (ref 1.2–2.2)
ALP SERPL-CCNC: 52 IU/L (ref 44–121)
ALT SERPL-CCNC: 36 IU/L (ref 0–44)
AST SERPL-CCNC: 20 IU/L (ref 0–40)
BILIRUB SERPL-MCNC: 0.7 MG/DL (ref 0–1.2)
BUN SERPL-MCNC: 12 MG/DL (ref 6–20)
BUN/CREAT SERPL: 13 (ref 9–20)
CALCIUM SERPL-MCNC: 9.5 MG/DL (ref 8.7–10.2)
CHLORIDE SERPL-SCNC: 104 MMOL/L (ref 96–106)
CO2 SERPL-SCNC: 22 MMOL/L (ref 20–29)
CREAT SERPL-MCNC: 0.89 MG/DL (ref 0.76–1.27)
EGFR: 116 ML/MIN/1.73
EST. AVERAGE GLUCOSE BLD GHB EST-MCNC: 111 MG/DL
GLOBULIN SER-MCNC: 2.4 G/DL (ref 1.5–4.5)
GLUCOSE SERPL-MCNC: 95 MG/DL (ref 65–99)
HBA1C MFR BLD: 5.5 % (ref 4.8–5.6)
POTASSIUM SERPL-SCNC: 4.5 MMOL/L (ref 3.5–5.2)
PROT SERPL-MCNC: 7 G/DL (ref 6–8.5)
SODIUM SERPL-SCNC: 142 MMOL/L (ref 134–144)

## 2022-05-05 NOTE — TELEPHONE ENCOUNTER
----- Message from Jose Juan Jones DO sent at 5/5/2022  8:18 AM EDT -----  Can let patient know that his blood sugar was normal as was his hemoglobin A1c

## 2022-07-08 ENCOUNTER — OFFICE VISIT (OUTPATIENT)
Dept: FAMILY MEDICINE CLINIC | Facility: CLINIC | Age: 32
End: 2022-07-08
Payer: COMMERCIAL

## 2022-07-08 VITALS
WEIGHT: 207.38 LBS | HEIGHT: 69 IN | DIASTOLIC BLOOD PRESSURE: 82 MMHG | SYSTOLIC BLOOD PRESSURE: 128 MMHG | BODY MASS INDEX: 30.72 KG/M2 | TEMPERATURE: 97.9 F | HEART RATE: 85 BPM

## 2022-07-08 DIAGNOSIS — R31.29 MICROSCOPIC HEMATURIA: ICD-10-CM

## 2022-07-08 DIAGNOSIS — N20.0 LEFT NEPHROLITHIASIS: ICD-10-CM

## 2022-07-08 DIAGNOSIS — R42 LIGHTHEADEDNESS: ICD-10-CM

## 2022-07-08 DIAGNOSIS — R35.0 FREQUENT URINATION: Primary | ICD-10-CM

## 2022-07-08 DIAGNOSIS — R35.1 NOCTURIA: ICD-10-CM

## 2022-07-08 LAB
BASOPHILS # BLD AUTO: 0 X10E3/UL (ref 0–0.2)
BASOPHILS NFR BLD AUTO: 1 %
EOSINOPHIL # BLD AUTO: 0.1 X10E3/UL (ref 0–0.4)
EOSINOPHIL NFR BLD AUTO: 2 %
ERYTHROCYTE [DISTWIDTH] IN BLOOD BY AUTOMATED COUNT: 12.4 % (ref 11.6–15.4)
HCT VFR BLD AUTO: 49 % (ref 37.5–51)
HGB BLD-MCNC: 16.6 G/DL (ref 13–17.7)
IMM GRANULOCYTES # BLD: 0 X10E3/UL (ref 0–0.1)
IMM GRANULOCYTES NFR BLD: 0 %
LYMPHOCYTES # BLD AUTO: 2.5 X10E3/UL (ref 0.7–3.1)
LYMPHOCYTES NFR BLD AUTO: 31 %
MCH RBC QN AUTO: 30.1 PG (ref 26.6–33)
MCHC RBC AUTO-ENTMCNC: 33.9 G/DL (ref 31.5–35.7)
MCV RBC AUTO: 89 FL (ref 79–97)
MONOCYTES # BLD AUTO: 0.6 X10E3/UL (ref 0.1–0.9)
MONOCYTES NFR BLD AUTO: 8 %
NEUTROPHILS # BLD AUTO: 4.7 X10E3/UL (ref 1.4–7)
NEUTROPHILS NFR BLD AUTO: 58 %
PLATELET # BLD AUTO: 245 X10E3/UL (ref 150–450)
RBC # BLD AUTO: 5.52 X10E6/UL (ref 4.14–5.8)
SL AMB  POCT GLUCOSE, UA: ABNORMAL
SL AMB LEUKOCYTE ESTERASE,UA: ABNORMAL
SL AMB POCT BILIRUBIN,UA: ABNORMAL
SL AMB POCT BLOOD,UA: ABNORMAL
SL AMB POCT CLARITY,UA: ABNORMAL
SL AMB POCT COLOR,UA: YELLOW
SL AMB POCT KETONES,UA: ABNORMAL
SL AMB POCT NITRITE,UA: ABNORMAL
SL AMB POCT PH,UA: 6
SL AMB POCT SPECIFIC GRAVITY,UA: 1.02
SL AMB POCT URINE PROTEIN: ABNORMAL
SL AMB POCT UROBILINOGEN: 0.2
WBC # BLD AUTO: 8 X10E3/UL (ref 3.4–10.8)

## 2022-07-08 PROCEDURE — 81002 URINALYSIS NONAUTO W/O SCOPE: CPT | Performed by: FAMILY MEDICINE

## 2022-07-08 PROCEDURE — 99213 OFFICE O/P EST LOW 20 MIN: CPT | Performed by: FAMILY MEDICINE

## 2022-07-08 NOTE — PROGRESS NOTES
Assessment/Plan:    No problem-specific Assessment & Plan notes found for this encounter  Diagnoses and all orders for this visit:    Lightheadedness  -     CBC and differential; Future  -     CBC and differential  One isolated incident  ? Etiology  Check cbc  Left nephrolithiasis  -     CT renal stone study abdomen pelvis wo contrast; Future  -     Ambulatory Referral to Urology; Future  -     Urine culture; Future    Nocturia  -     CT renal stone study abdomen pelvis wo contrast; Future  -     Ambulatory Referral to Urology; Future  -     Urine culture; Future  Ongoing x 1 month  No daytime frequency and no associated dysuria, hematuria, abdominal pain  Has known history of left sided nephrolithiasis, has had some intermittent flank pain and trace blood on his urine dip here in office so I am suspecting this may be related to his kidney stone  Discussed other possible etiologies of nocturia  He had normal glucose/a1c done in may of this year  Will send urine for culture and check CT stone study  I also put in referral to urology regarding this stone    Microscopic hematuria  -     CT renal stone study abdomen pelvis wo contrast; Future  -     Ambulatory Referral to Urology; Future  -     Urine culture; Future            Subjective:      Patient ID: Lucio Homans is a 33 y o  male with history of hep c, renal calculus here today for complaint of nocturia  Started approximately one month ago  States that in the last month, he will have to get up around 2 times per night to urinate  One night got up 5 times within first 2 hours of lying down  Does not go frequently throughout the day  Denies any associated dysuria, hematuria, abdominal pain, fever, chills, nausea or vomiting  Does admit to some left sided flank pain intermittently  Has tried cutting fluids off around 7 pm but this has not made any difference  Has known stone in left side   Last CT done 8/28/19 showed:    No hydronephrosis or hydroureter    Bilateral nonobstructing intrarenal calculi, the largest calculus which appears partially fragmented at the left renal lower pole measuring 14 x 8 mm    No acute intra-abdominal abnormality  No free air or free fluid  Also reports having one episode of lightheadedness last week  States it felt as if he was going to pass out when it occurred  Was not having any pain at the time of the incident  Head eaten prior to the incident  Was seated when it occurred  Lasted for 5 minutes  No associated chest pain, palpitations or shortness of breath  Has felt fine since  He has no history of anemia       Recently had labs done to f/u on his impaired fasting glucose (glucose of 100) and both his glucose and A1c were normal      HPI    The following portions of the patient's history were reviewed and updated as appropriate:   He  has a past medical history of Anxiety disorder (10/17/2012), COVID-19 (01/06/2022), Depression (10/17/2012), History of hepatitis C, Kidney stone, Substance abuse ([facility] [address] ), and Umbilical pain  He  has a past surgical history that includes Cystoscopy w/ ureteral stent placement (2012); Wrist fracture surgery (Right); and pr repair umbilical WTGP,5+R/R,FREZE (N/A, 2/23/2022)  He  reports that he quit smoking about 6 years ago  His smoking use included cigarettes  He has a 10 00 pack-year smoking history  He has never used smokeless tobacco  He reports previous alcohol use  He reports previous drug use  Current Outpatient Medications on File Prior to Visit   Medication Sig    fluticasone (FLONASE) 50 mcg/act nasal spray 1 spray into each nostril as needed       No current facility-administered medications on file prior to visit  He has No Known Allergies       Review of Systems      Objective:      /82   Pulse 85   Temp 97 9 °F (36 6 °C)   Ht 5' 9" (1 753 m)   Wt 94 1 kg (207 lb 6 oz)   BMI 30 62 kg/m²          Physical Exam  Vitals and nursing note reviewed  Constitutional:       General: He is not in acute distress  Appearance: Normal appearance  He is not ill-appearing, toxic-appearing or diaphoretic  HENT:      Head: Normocephalic and atraumatic  Mouth/Throat:      Mouth: Mucous membranes are moist       Pharynx: No oropharyngeal exudate or posterior oropharyngeal erythema  Eyes:      Extraocular Movements: Extraocular movements intact  Conjunctiva/sclera: Conjunctivae normal       Pupils: Pupils are equal, round, and reactive to light  Cardiovascular:      Rate and Rhythm: Normal rate and regular rhythm  Heart sounds: No murmur heard  Pulmonary:      Effort: Pulmonary effort is normal       Breath sounds: Normal breath sounds  Abdominal:      General: Abdomen is flat  Bowel sounds are normal  There is no distension  Palpations: Abdomen is soft  There is no mass  Tenderness: There is no abdominal tenderness  There is no right CVA tenderness or left CVA tenderness  Musculoskeletal:      Cervical back: Normal range of motion and neck supple  Right lower leg: No edema  Left lower leg: No edema  Lymphadenopathy:      Cervical: No cervical adenopathy  Skin:     General: Skin is warm and dry  Findings: No rash  Neurological:      Mental Status: He is alert     Psychiatric:         Mood and Affect: Mood normal

## 2022-07-27 ENCOUNTER — HOSPITAL ENCOUNTER (OUTPATIENT)
Dept: CT IMAGING | Facility: HOSPITAL | Age: 32
Discharge: HOME | End: 2022-07-27
Payer: COMMERCIAL

## 2022-07-27 DIAGNOSIS — N20.0 LEFT NEPHROLITHIASIS: ICD-10-CM

## 2022-07-27 DIAGNOSIS — R35.1 NOCTURIA: ICD-10-CM

## 2022-07-27 DIAGNOSIS — R31.29 MICROSCOPIC HEMATURIA: ICD-10-CM

## 2022-07-27 PROCEDURE — 74176 CT ABD & PELVIS W/O CONTRAST: CPT

## 2022-08-09 ENCOUNTER — OFFICE VISIT (OUTPATIENT)
Dept: UROLOGY | Facility: HOSPITAL | Age: 32
End: 2022-08-09
Payer: COMMERCIAL

## 2022-08-09 VITALS
DIASTOLIC BLOOD PRESSURE: 88 MMHG | BODY MASS INDEX: 30.36 KG/M2 | OXYGEN SATURATION: 97 % | SYSTOLIC BLOOD PRESSURE: 120 MMHG | HEART RATE: 81 BPM | HEIGHT: 69 IN | WEIGHT: 205 LBS

## 2022-08-09 DIAGNOSIS — R31.29 MICROSCOPIC HEMATURIA: ICD-10-CM

## 2022-08-09 DIAGNOSIS — N20.0 CALCULUS OF KIDNEY: ICD-10-CM

## 2022-08-09 DIAGNOSIS — R35.1 NOCTURIA: ICD-10-CM

## 2022-08-09 DIAGNOSIS — N20.0 LEFT NEPHROLITHIASIS: Primary | ICD-10-CM

## 2022-08-09 LAB
SL AMB  POCT GLUCOSE, UA: ABNORMAL
SL AMB LEUKOCYTE ESTERASE,UA: ABNORMAL
SL AMB POCT BILIRUBIN,UA: ABNORMAL
SL AMB POCT BLOOD,UA: ABNORMAL
SL AMB POCT CLARITY,UA: CLEAR
SL AMB POCT COLOR,UA: YELLOW
SL AMB POCT KETONES,UA: ABNORMAL
SL AMB POCT NITRITE,UA: ABNORMAL
SL AMB POCT PH,UA: 8
SL AMB POCT SPECIFIC GRAVITY,UA: 1.02
SL AMB POCT URINE PROTEIN: + 30
SL AMB POCT UROBILINOGEN: 0.2

## 2022-08-09 PROCEDURE — 99203 OFFICE O/P NEW LOW 30 MIN: CPT | Performed by: NURSE PRACTITIONER

## 2022-08-09 PROCEDURE — 81002 URINALYSIS NONAUTO W/O SCOPE: CPT | Performed by: NURSE PRACTITIONER

## 2022-08-09 RX ORDER — CEFAZOLIN SODIUM 2 G/50ML
2000 SOLUTION INTRAVENOUS ONCE
Status: CANCELLED | OUTPATIENT
Start: 2022-08-09 | End: 2022-08-09

## 2022-08-09 NOTE — PROGRESS NOTES
08/09/22    Arvind Valiente Host   1990   [id number]     Assessment  1 Nephrolithiasis     Discussion/Plan  1 Nephrolithiasis   7/27/22 RIGHT KIDNEY AND URETER:  Few renal calculi, 2 mm and smaller including new 1 mm interpolar calculus  No hydronephrosis or hydroureter  LEFT KIDNEY AND URETER:  1 8 cm lower pole calyceal calculus previously 1 2 cm when measured in the same fashion  New 5 mm interpolar calculus  Additional punctate renal calculi, 3 mm and smaller are unchanged  No hydronephrosis or hydroureter  Referral to Nephrology for metabolic work up   ER precautions reviewed   Educational packet provided, dietary recommendations discussed      Patient wishes to move forward with outpatient surgery  Left URS case request ordered  He will be contacted by surgical scheduler  All questions answered at this time  Scott BLISS   Stevie Berrios is a 33year old male who presents in consultation for evaluation of kidney stones  He passed a stone 6-7 years ago  He has also required ESWL and URS with stents at [facility] in the past  He is experiencing intermittent left back pain that he describes as tightness/soreness  He consumes 1 gallon of water daily  He is unsure of his stone composition  His father with history of gout  He denies fever, chills, nausea, vomiting, gross hematuria, dysuria  Review of Systems - History obtained from chart review and the patient  General ROS: negative  Endocrine ROS: negative  Respiratory ROS: no cough, shortness of breath, or wheezing  Cardiovascular ROS: no chest pain or dyspnea on exertion  Gastrointestinal ROS: no abdominal pain, change in bowel habits, or black or bloody stools  Genito-Urinary ROS: no dysuria, trouble voiding, or hematuria  Musculoskeletal ROS: positive for - pain in back - left  Neurological ROS: no TIA or stroke symptoms  Dermatological ROS: negative       Objective  Physical Exam  Vitals and nursing note reviewed     Constitutional:       General: He is awake  He is not in acute distress  Appearance: Normal appearance  He is well-developed, well-groomed and normal weight  He is not ill-appearing, toxic-appearing or diaphoretic  Pulmonary:      Effort: Pulmonary effort is normal    Abdominal:      Tenderness: There is no right CVA tenderness or left CVA tenderness  Musculoskeletal:         General: Normal range of motion  Cervical back: Normal range of motion and neck supple  Skin:     General: Skin is warm  Neurological:      General: No focal deficit present  Mental Status: He is alert and oriented to person, place, and time  Mental status is at baseline  Psychiatric:         Attention and Perception: Attention normal          Mood and Affect: Mood normal          Speech: Speech normal          Behavior: Behavior normal  Behavior is cooperative  Thought Content: Thought content normal          Cognition and Memory: Cognition normal          Judgment: Judgment normal            CT ABDOMEN AND PELVIS WITHOUT IV CONTRAST - LOW DOSE RENAL STONE      INDICATION:   N20 0: Calculus of kidney  R35 1: Nocturia  R31 29: Other microscopic hematuria      COMPARISON:  CT abdomen and pelvis 8/28/2019     TECHNIQUE:  Low radiation dose thin section CT examination of the abdomen and pelvis was performed without intravenous or oral contrast according to a protocol specifically designed to evaluate for urinary tract calculus  Axial, sagittal, and coronal 2D   reformatted images were created from the source data and submitted for interpretation  Evaluation for pathology in the abdomen and pelvis that is unrelated to urinary tract calculi is limited        Radiation dose length product (DLP) for this visit:  417 57 mGy-cm     This examination, like all CT scans performed in the [facility], was performed utilizing techniques to minimize radiation dose exposure, including the use of iterative   reconstruction and automated exposure control      URINARY TRACT FINDINGS:     RIGHT KIDNEY AND URETER:  Few renal calculi, 2 mm and smaller including new 1 mm interpolar calculus  No hydronephrosis or hydroureter      LEFT KIDNEY AND URETER:  1 8 cm lower pole calyceal calculus previously 1 2 cm when measured in the same fashion  New 5 mm interpolar calculus  Additional punctate renal calculi, 3 mm and smaller are unchanged  No hydronephrosis or hydroureter      URINARY BLADDER:  Unremarkable         ADDITIONAL FINDINGS:     LOWER CHEST:  No clinically significant abnormality identified in the visualized lower chest      SOLID VISCERA: Visualized portion of the liver appears to be diffusely decreased in density suggestive of mild steatosis  Otherwise, limited low radiation dose CT demonstrates no clinically significant abnormality of visualized solid abdominal viscera       GALLBLADDER/BILIARY TREE:  No calcified gallstones  No pericholecystic inflammatory change  No biliary dilatation      STOMACH AND BOWEL:  Unremarkable      APPENDIX:  A normal appendix was visualized      ABDOMINOPELVIC CAVITY:  No ascites  No pneumoperitoneum  No lymphadenopathy      REPRODUCTIVE ORGANS:  Unremarkable for patient's age      ABDOMINAL WALL/INGUINAL REGIONS:  Interval umbilical hernia repair  Stable small bilateral inguinal fat-containing hernias      OSSEOUS STRUCTURES:  No acute fracture or osseous destructive lesion identified  Mild degenerative changes of the spine      IMPRESSION:     Bilateral renal nonobstructing calculi, left greater than right with increasing stone burden, left more so than right since August 2019      No evidence of acute abdominopelvic process      Hepatic steatosis  MAK Bowie     I have spent 15 minutes with Patient  today in which greater than 50% of this time was spent in counseling/coordination of care regarding Diagnostic results and Intructions for management

## 2022-08-09 NOTE — H&P (VIEW-ONLY)
08/09/22    Ruthe Pouch Host   1990   [id number]     Assessment  1 Nephrolithiasis     Discussion/Plan  1 Nephrolithiasis   7/27/22 RIGHT KIDNEY AND URETER:  Few renal calculi, 2 mm and smaller including new 1 mm interpolar calculus  No hydronephrosis or hydroureter  LEFT KIDNEY AND URETER:  1 8 cm lower pole calyceal calculus previously 1 2 cm when measured in the same fashion  New 5 mm interpolar calculus  Additional punctate renal calculi, 3 mm and smaller are unchanged  No hydronephrosis or hydroureter  Referral to Nephrology for metabolic work up   ER precautions reviewed   Educational packet provided, dietary recommendations discussed      Patient wishes to move forward with outpatient surgery  Left URS case request ordered  He will be contacted by surgical scheduler  All questions answered at this time  Scott BLISS   Donna Luz is a 33year old male who presents in consultation for evaluation of kidney stones  He passed a stone 6-7 years ago  He has also required ESWL and URS with stents at [facility] in the past  He is experiencing intermittent left back pain that he describes as tightness/soreness  He consumes 1 gallon of water daily  He is unsure of his stone composition  His father with history of gout  He denies fever, chills, nausea, vomiting, gross hematuria, dysuria  Review of Systems - History obtained from chart review and the patient  General ROS: negative  Endocrine ROS: negative  Respiratory ROS: no cough, shortness of breath, or wheezing  Cardiovascular ROS: no chest pain or dyspnea on exertion  Gastrointestinal ROS: no abdominal pain, change in bowel habits, or black or bloody stools  Genito-Urinary ROS: no dysuria, trouble voiding, or hematuria  Musculoskeletal ROS: positive for - pain in back - left  Neurological ROS: no TIA or stroke symptoms  Dermatological ROS: negative       Objective  Physical Exam  Vitals and nursing note reviewed     Constitutional:       General: He is awake  He is not in acute distress  Appearance: Normal appearance  He is well-developed, well-groomed and normal weight  He is not ill-appearing, toxic-appearing or diaphoretic  Pulmonary:      Effort: Pulmonary effort is normal    Abdominal:      Tenderness: There is no right CVA tenderness or left CVA tenderness  Musculoskeletal:         General: Normal range of motion  Cervical back: Normal range of motion and neck supple  Skin:     General: Skin is warm  Neurological:      General: No focal deficit present  Mental Status: He is alert and oriented to person, place, and time  Mental status is at baseline  Psychiatric:         Attention and Perception: Attention normal          Mood and Affect: Mood normal          Speech: Speech normal          Behavior: Behavior normal  Behavior is cooperative  Thought Content: Thought content normal          Cognition and Memory: Cognition normal          Judgment: Judgment normal            CT ABDOMEN AND PELVIS WITHOUT IV CONTRAST - LOW DOSE RENAL STONE      INDICATION:   N20 0: Calculus of kidney  R35 1: Nocturia  R31 29: Other microscopic hematuria      COMPARISON:  CT abdomen and pelvis 8/28/2019     TECHNIQUE:  Low radiation dose thin section CT examination of the abdomen and pelvis was performed without intravenous or oral contrast according to a protocol specifically designed to evaluate for urinary tract calculus  Axial, sagittal, and coronal 2D   reformatted images were created from the source data and submitted for interpretation  Evaluation for pathology in the abdomen and pelvis that is unrelated to urinary tract calculi is limited        Radiation dose length product (DLP) for this visit:  417 57 mGy-cm     This examination, like all CT scans performed in the [facility], was performed utilizing techniques to minimize radiation dose exposure, including the use of iterative   reconstruction and automated exposure control      URINARY TRACT FINDINGS:     RIGHT KIDNEY AND URETER:  Few renal calculi, 2 mm and smaller including new 1 mm interpolar calculus  No hydronephrosis or hydroureter      LEFT KIDNEY AND URETER:  1 8 cm lower pole calyceal calculus previously 1 2 cm when measured in the same fashion  New 5 mm interpolar calculus  Additional punctate renal calculi, 3 mm and smaller are unchanged  No hydronephrosis or hydroureter      URINARY BLADDER:  Unremarkable         ADDITIONAL FINDINGS:     LOWER CHEST:  No clinically significant abnormality identified in the visualized lower chest      SOLID VISCERA: Visualized portion of the liver appears to be diffusely decreased in density suggestive of mild steatosis  Otherwise, limited low radiation dose CT demonstrates no clinically significant abnormality of visualized solid abdominal viscera       GALLBLADDER/BILIARY TREE:  No calcified gallstones  No pericholecystic inflammatory change  No biliary dilatation      STOMACH AND BOWEL:  Unremarkable      APPENDIX:  A normal appendix was visualized      ABDOMINOPELVIC CAVITY:  No ascites  No pneumoperitoneum  No lymphadenopathy      REPRODUCTIVE ORGANS:  Unremarkable for patient's age      ABDOMINAL WALL/INGUINAL REGIONS:  Interval umbilical hernia repair  Stable small bilateral inguinal fat-containing hernias      OSSEOUS STRUCTURES:  No acute fracture or osseous destructive lesion identified  Mild degenerative changes of the spine      IMPRESSION:     Bilateral renal nonobstructing calculi, left greater than right with increasing stone burden, left more so than right since August 2019      No evidence of acute abdominopelvic process      Hepatic steatosis  MAK Buckley     I have spent 15 minutes with Patient  today in which greater than 50% of this time was spent in counseling/coordination of care regarding Diagnostic results and Intructions for management

## 2022-08-12 ENCOUNTER — PREP FOR PROCEDURE (OUTPATIENT)
Dept: UROLOGY | Facility: CLINIC | Age: 32
End: 2022-08-12

## 2022-08-12 ENCOUNTER — TELEPHONE (OUTPATIENT)
Dept: UROLOGY | Facility: CLINIC | Age: 32
End: 2022-08-12

## 2022-08-12 ENCOUNTER — TELEPHONE (OUTPATIENT)
Dept: UROLOGY | Facility: MEDICAL CENTER | Age: 32
End: 2022-08-12

## 2022-08-12 DIAGNOSIS — N20.0 LEFT NEPHROLITHIASIS: Primary | ICD-10-CM

## 2022-08-12 NOTE — TELEPHONE ENCOUNTER
Spoke with patient and he is scheduled for 8/18 with AS at the [facility]  He is aware the hospital will call the day prior with time of arrival, nothing to eat or drink after midnight, he will need a , and to hold blood thinning medications 7 days prior  He will go for urine culture by Saturday morning       Emailed surgical pkt per patient request

## 2022-08-13 ENCOUNTER — APPOINTMENT (OUTPATIENT)
Dept: LAB | Facility: HOSPITAL | Age: 32
End: 2022-08-13
Attending: UROLOGY
Payer: COMMERCIAL

## 2022-08-13 DIAGNOSIS — R82.79 URINE CULTURE POSITIVE: ICD-10-CM

## 2022-08-13 PROCEDURE — 87086 URINE CULTURE/COLONY COUNT: CPT

## 2022-08-14 LAB — BACTERIA UR CULT: NORMAL

## 2022-08-15 ENCOUNTER — TELEPHONE (OUTPATIENT)
Dept: FAMILY MEDICINE CLINIC | Facility: CLINIC | Age: 32
End: 2022-08-15

## 2022-08-15 NOTE — TELEPHONE ENCOUNTER
Pt leonora ramírez regarding a billing question from [facility] on 03/30/2022  Pt stated that [employer] had contacted him stating this he owed a co-pay for the OV  I reviewed the past billing history with the patient and advised that he had not paid a co-pay on 03/30/2022  Pt confirmed understanding and stated that he would call [employer] to settle the charge

## 2022-08-15 NOTE — TELEPHONE ENCOUNTER
I followed up regarding patient's VM left on our line, requesting a call back  I left a VM and awaiting pt's call back

## 2022-08-16 ENCOUNTER — TELEPHONE (OUTPATIENT)
Dept: NEPHROLOGY | Facility: CLINIC | Age: 32
End: 2022-08-16

## 2022-08-16 ENCOUNTER — ANESTHESIA EVENT (OUTPATIENT)
Dept: PERIOP | Facility: AMBULARY SURGERY CENTER | Age: 32
End: 2022-08-16
Payer: COMMERCIAL

## 2022-08-16 NOTE — TELEPHONE ENCOUNTER
Left voice mail to schedule appointment  This is the 2nd attempt  A/P  17y s/p , PPD #2, stable  1. Pain: well controlled on oral pain medications  2. GI: Regular diet  3. : voiding spontaneously  4. DVT prophylaxis: ambulation  5. Contraception - postpartum IUD placed  6. Dispo: PPD# 2, unless otherwise specified

## 2022-08-16 NOTE — TELEPHONE ENCOUNTER
New Patient Intake Form   Patient Details   Hortensia Harley     1990     [id number]     Appointment Information   Who is calling to schedule? If not patient, what is callers name? Patient    Referring Provider Carol Rojo - Urology   Referring Provider Number [phone]      Reason for Appt (Diagnosis) N20 0 (ICD-10-CM) - Calculus of kidney   Is patient aware of why they are being referred? Yes   Does Patient have labs done at [place] [address]? If not, where do they go? Yes and [facility] on [address] [facility] in [place], [place]   Has patient had labs / urine work done? List date of most recent lab / urine work Yes   Has patient had a BMP & CBC done in the past 2 years? If so, list the date Yes   Has patient been hospitalized recently? If yes, list name and location of hospital they were In No   Has patient been seen by a Nephrologist before? If yes, list name, location and phone number No   Has patient been see by another Specialty before (ex  Neurology, urology, cardiology)? If yes, please list name, and specialty Urology   Has the patient had imaging done? If so, list the most recent date and type of imagineg Yes   Does the patient has a stone analysis report if history of kidney stones? Currently has scheduled procedure to remove kidney stones    Appointment Details   Is there a referral on file?  yes   Appointment Date 11/3   Location [place]    Miscellaneous

## 2022-08-17 NOTE — PRE-PROCEDURE INSTRUCTIONS
Pre-Surgery Instructions:   Medication Instructions    fluticasone (FLONASE) 50 mcg/act nasal spray Uses PRN- OK to take day of surgery      Reviewed with patient, in detail, instructions from "My Surgical Experience"  Instructed to avoid all  OTC vitamins/supplements and NSAIDS from now until after surgery per anesthesia guidelines  Tylenol ok to take PRN  Advised patient that Rubens Morocho will call with surgery arrival time and hospital directions the business day prior to surgery  Advised patient nothing eat or drink after midnight prior to surgery  Instructed to call surgeon's office in meantime with any new illnesses/exposure  Patient verbalized understanding of current visitor restrictions/masking guidelines and advised that he/she can confirm these at time of arrival call with Rubens Morocho  Patient verbalized understanding and knows to call surgeon's office with any additional questions prior to surgery

## 2022-08-18 ENCOUNTER — HOSPITAL ENCOUNTER (OUTPATIENT)
Facility: AMBULARY SURGERY CENTER | Age: 32
Setting detail: OUTPATIENT SURGERY
Discharge: HOME | End: 2022-08-18
Attending: UROLOGY | Admitting: UROLOGY
Payer: COMMERCIAL

## 2022-08-18 ENCOUNTER — ANESTHESIA (OUTPATIENT)
Dept: PERIOP | Facility: AMBULARY SURGERY CENTER | Age: 32
End: 2022-08-18
Payer: COMMERCIAL

## 2022-08-18 ENCOUNTER — APPOINTMENT (OUTPATIENT)
Dept: RADIOLOGY | Facility: AMBULARY SURGERY CENTER | Age: 32
End: 2022-08-18
Payer: COMMERCIAL

## 2022-08-18 ENCOUNTER — TELEPHONE (OUTPATIENT)
Dept: UROLOGY | Facility: AMBULATORY SURGERY CENTER | Age: 32
End: 2022-08-18

## 2022-08-18 VITALS
BODY MASS INDEX: 30.07 KG/M2 | HEIGHT: 69 IN | RESPIRATION RATE: 18 BRPM | SYSTOLIC BLOOD PRESSURE: 136 MMHG | WEIGHT: 203 LBS | DIASTOLIC BLOOD PRESSURE: 91 MMHG | HEART RATE: 89 BPM | OXYGEN SATURATION: 97 % | TEMPERATURE: 97.8 F

## 2022-08-18 DIAGNOSIS — N20.0 LEFT NEPHROLITHIASIS: ICD-10-CM

## 2022-08-18 PROCEDURE — C1894 INTRO/SHEATH, NON-LASER: HCPCS | Performed by: UROLOGY

## 2022-08-18 PROCEDURE — C2617 STENT, NON-COR, TEM W/O DEL: HCPCS | Performed by: UROLOGY

## 2022-08-18 PROCEDURE — C1758 CATHETER, URETERAL: HCPCS | Performed by: UROLOGY

## 2022-08-18 PROCEDURE — 74420 UROGRAPHY RTRGR +-KUB: CPT

## 2022-08-18 PROCEDURE — 52356 CYSTO/URETERO W/LITHOTRIPSY: CPT | Performed by: UROLOGY

## 2022-08-18 PROCEDURE — C1769 GUIDE WIRE: HCPCS | Performed by: UROLOGY

## 2022-08-18 PROCEDURE — 88300 SURGICAL PATH GROSS: CPT | Performed by: STUDENT IN AN ORGANIZED HEALTH CARE EDUCATION/TRAINING PROGRAM

## 2022-08-18 PROCEDURE — 82360 CALCULUS ASSAY QUANT: CPT | Performed by: UROLOGY

## 2022-08-18 DEVICE — INLAY OPTIMA URETERAL STENT W/O GUIDEWIRE
Type: IMPLANTABLE DEVICE | Site: URETER | Status: FUNCTIONAL
Brand: BARD® INLAY OPTIMA® URETERAL STENT

## 2022-08-18 RX ORDER — SODIUM CHLORIDE, SODIUM LACTATE, POTASSIUM CHLORIDE, CALCIUM CHLORIDE 600; 310; 30; 20 MG/100ML; MG/100ML; MG/100ML; MG/100ML
INJECTION, SOLUTION INTRAVENOUS CONTINUOUS PRN
Status: DISCONTINUED | OUTPATIENT
Start: 2022-08-18 | End: 2022-08-18

## 2022-08-18 RX ORDER — MEPERIDINE HYDROCHLORIDE 25 MG/ML
12.5 INJECTION INTRAMUSCULAR; INTRAVENOUS; SUBCUTANEOUS
Status: DISCONTINUED | OUTPATIENT
Start: 2022-08-18 | End: 2022-08-18 | Stop reason: HOSPADM

## 2022-08-18 RX ORDER — PROMETHAZINE HYDROCHLORIDE 25 MG/ML
25 INJECTION, SOLUTION INTRAMUSCULAR; INTRAVENOUS ONCE AS NEEDED
Status: COMPLETED | OUTPATIENT
Start: 2022-08-18 | End: 2022-08-18

## 2022-08-18 RX ORDER — NEOSTIGMINE METHYLSULFATE 1 MG/ML
INJECTION INTRAVENOUS AS NEEDED
Status: DISCONTINUED | OUTPATIENT
Start: 2022-08-18 | End: 2022-08-18

## 2022-08-18 RX ORDER — ONDANSETRON 2 MG/ML
4 INJECTION INTRAMUSCULAR; INTRAVENOUS ONCE AS NEEDED
Status: DISCONTINUED | OUTPATIENT
Start: 2022-08-18 | End: 2022-08-18 | Stop reason: HOSPADM

## 2022-08-18 RX ORDER — LIDOCAINE HYDROCHLORIDE 10 MG/ML
INJECTION, SOLUTION EPIDURAL; INFILTRATION; INTRACAUDAL; PERINEURAL AS NEEDED
Status: DISCONTINUED | OUTPATIENT
Start: 2022-08-18 | End: 2022-08-18

## 2022-08-18 RX ORDER — FENTANYL CITRATE/PF 50 MCG/ML
50 SYRINGE (ML) INJECTION
Status: DISCONTINUED | OUTPATIENT
Start: 2022-08-18 | End: 2022-08-18 | Stop reason: HOSPADM

## 2022-08-18 RX ORDER — CEFDINIR 300 MG/1
300 CAPSULE ORAL EVERY 12 HOURS SCHEDULED
Qty: 6 CAPSULE | Refills: 0 | Status: SHIPPED | OUTPATIENT
Start: 2022-08-18 | End: 2022-08-21

## 2022-08-18 RX ORDER — KETOROLAC TROMETHAMINE 30 MG/ML
INJECTION, SOLUTION INTRAMUSCULAR; INTRAVENOUS AS NEEDED
Status: DISCONTINUED | OUTPATIENT
Start: 2022-08-18 | End: 2022-08-18

## 2022-08-18 RX ORDER — MIDAZOLAM HYDROCHLORIDE 2 MG/2ML
INJECTION, SOLUTION INTRAMUSCULAR; INTRAVENOUS AS NEEDED
Status: DISCONTINUED | OUTPATIENT
Start: 2022-08-18 | End: 2022-08-18

## 2022-08-18 RX ORDER — KETOROLAC TROMETHAMINE 30 MG/ML
15 INJECTION, SOLUTION INTRAMUSCULAR; INTRAVENOUS ONCE
Status: COMPLETED | OUTPATIENT
Start: 2022-08-18 | End: 2022-08-18

## 2022-08-18 RX ORDER — OXYBUTYNIN CHLORIDE 5 MG/1
5 TABLET ORAL 3 TIMES DAILY PRN
Qty: 30 TABLET | Refills: 0 | Status: SHIPPED | OUTPATIENT
Start: 2022-08-18 | End: 2022-09-30

## 2022-08-18 RX ORDER — CEFAZOLIN SODIUM 2 G/50ML
2000 SOLUTION INTRAVENOUS ONCE
Status: COMPLETED | OUTPATIENT
Start: 2022-08-18 | End: 2022-08-18

## 2022-08-18 RX ORDER — FENTANYL CITRATE 50 UG/ML
INJECTION, SOLUTION INTRAMUSCULAR; INTRAVENOUS AS NEEDED
Status: DISCONTINUED | OUTPATIENT
Start: 2022-08-18 | End: 2022-08-18

## 2022-08-18 RX ORDER — ROCURONIUM BROMIDE 10 MG/ML
INJECTION, SOLUTION INTRAVENOUS AS NEEDED
Status: DISCONTINUED | OUTPATIENT
Start: 2022-08-18 | End: 2022-08-18

## 2022-08-18 RX ORDER — OXYBUTYNIN CHLORIDE 5 MG/1
5 TABLET ORAL 3 TIMES DAILY
Status: DISCONTINUED | OUTPATIENT
Start: 2022-08-18 | End: 2022-08-18 | Stop reason: HOSPADM

## 2022-08-18 RX ORDER — GLYCOPYRROLATE 0.2 MG/ML
INJECTION INTRAMUSCULAR; INTRAVENOUS AS NEEDED
Status: DISCONTINUED | OUTPATIENT
Start: 2022-08-18 | End: 2022-08-18

## 2022-08-18 RX ORDER — ONDANSETRON 2 MG/ML
INJECTION INTRAMUSCULAR; INTRAVENOUS AS NEEDED
Status: DISCONTINUED | OUTPATIENT
Start: 2022-08-18 | End: 2022-08-18

## 2022-08-18 RX ORDER — MAGNESIUM HYDROXIDE 1200 MG/15ML
LIQUID ORAL AS NEEDED
Status: DISCONTINUED | OUTPATIENT
Start: 2022-08-18 | End: 2022-08-18 | Stop reason: HOSPADM

## 2022-08-18 RX ORDER — DEXAMETHASONE SODIUM PHOSPHATE 10 MG/ML
INJECTION, SOLUTION INTRAMUSCULAR; INTRAVENOUS AS NEEDED
Status: DISCONTINUED | OUTPATIENT
Start: 2022-08-18 | End: 2022-08-18

## 2022-08-18 RX ORDER — PROPOFOL 10 MG/ML
INJECTION, EMULSION INTRAVENOUS AS NEEDED
Status: DISCONTINUED | OUTPATIENT
Start: 2022-08-18 | End: 2022-08-18

## 2022-08-18 RX ADMIN — FENTANYL CITRATE 50 MCG: 50 INJECTION INTRAMUSCULAR; INTRAVENOUS at 12:54

## 2022-08-18 RX ADMIN — CEFAZOLIN SODIUM 2000 MG: 2 SOLUTION INTRAVENOUS at 12:41

## 2022-08-18 RX ADMIN — SODIUM CHLORIDE, SODIUM LACTATE, POTASSIUM CHLORIDE, AND CALCIUM CHLORIDE: .6; .31; .03; .02 INJECTION, SOLUTION INTRAVENOUS at 12:20

## 2022-08-18 RX ADMIN — MIDAZOLAM 2 MG: 1 INJECTION INTRAMUSCULAR; INTRAVENOUS at 12:40

## 2022-08-18 RX ADMIN — ONDANSETRON 4 MG: 2 INJECTION INTRAMUSCULAR; INTRAVENOUS at 14:18

## 2022-08-18 RX ADMIN — DEXAMETHASONE SODIUM PHOSPHATE 10 MG: 10 INJECTION, SOLUTION INTRAMUSCULAR; INTRAVENOUS at 12:54

## 2022-08-18 RX ADMIN — KETOROLAC TROMETHAMINE 15 MG: 30 INJECTION, SOLUTION INTRAMUSCULAR at 14:11

## 2022-08-18 RX ADMIN — GLYCOPYRROLATE 0.2 MG: 0.2 INJECTION INTRAMUSCULAR; INTRAVENOUS at 13:58

## 2022-08-18 RX ADMIN — SODIUM CHLORIDE, SODIUM LACTATE, POTASSIUM CHLORIDE, AND CALCIUM CHLORIDE: .6; .31; .03; .02 INJECTION, SOLUTION INTRAVENOUS at 14:19

## 2022-08-18 RX ADMIN — ONDANSETRON 4 MG: 2 INJECTION INTRAMUSCULAR; INTRAVENOUS at 12:54

## 2022-08-18 RX ADMIN — ROCURONIUM BROMIDE 40 MG: 10 INJECTION, SOLUTION INTRAVENOUS at 12:44

## 2022-08-18 RX ADMIN — FENTANYL CITRATE 50 MCG: 50 INJECTION INTRAMUSCULAR; INTRAVENOUS at 12:44

## 2022-08-18 RX ADMIN — KETOROLAC TROMETHAMINE 15 MG: 30 INJECTION, SOLUTION INTRAMUSCULAR; INTRAVENOUS at 17:11

## 2022-08-18 RX ADMIN — OXYBUTYNIN CHLORIDE 5 MG: 5 TABLET ORAL at 17:13

## 2022-08-18 RX ADMIN — PROMETHAZINE HYDROCHLORIDE 25 MG: 25 INJECTION INTRAMUSCULAR; INTRAVENOUS at 14:40

## 2022-08-18 RX ADMIN — PROPOFOL 200 MG: 10 INJECTION, EMULSION INTRAVENOUS at 12:44

## 2022-08-18 RX ADMIN — NEOSTIGMINE METHYLSULFATE 3 MG: 1 INJECTION INTRAVENOUS at 13:58

## 2022-08-18 RX ADMIN — LIDOCAINE HYDROCHLORIDE 50 MG: 10 INJECTION, SOLUTION EPIDURAL; INFILTRATION; INTRACAUDAL; PERINEURAL at 12:44

## 2022-08-18 NOTE — ANESTHESIA POSTPROCEDURE EVALUATION
Post-Op Assessment Note    CV Status:  Stable  Pain Score: 0    Pain management: adequate     Mental Status:  Sleepy   PONV Controlled:  None   Airway Patency:  Patent      Post Op Vitals Reviewed: Yes      Staff: CRNA         No complications documented      BP   130/75   Temp  97 0   Pulse 77   Resp 18   SpO2 100

## 2022-08-18 NOTE — OP NOTE
OPERATIVE REPORT  PATIENT NAME: Donna Blas Host    :  1990  MRN: [id number]  Pt Location: [facility] OR ROOM 05    SURGERY DATE: 2022    Surgeon(s) and Role:     * Nery Gamez MD - Primary    Preop Diagnosis:  Left nephrolithiasis [N20 0] measuring 1 8 cm    Post-Op Diagnosis Codes:     * Left nephrolithiasis [N20 0] measuring 1 8 cm    Procedure(s) (LRB):  CYSTOSCOPY URETEROSCOPY WITH LITHOTRIPSY HOLMIUM LASER, RETROGRADE PYELOGRAM AND INSERTION STENT URETERAL (Left) of large renal stone    Specimen(s):  ID Type Source Tests Collected by Time Destination   1 : Left kidney stone Calculus Kidney, Left STONE ANALYSIS, TISSUE EXAM Nery Gamez MD 2022 [id number]        Estimated Blood Loss:   Minimal    Drains:  Ureteral Internal Stent Left ureter 6 Fr  (Active)   Number of days: 0       Anesthesia Type:   General    Operative Indications:  Patient with recurrent nephrolithiasis who has been having intermittent left-sided back pain and found to have 1 8 cm left lower pole renal stone who elected for elective lithotripsy    Operative Findings:  Yellow spiculated stone overtaking the left lower pole  Ki De La Rosa was primarily dusted but also fragmented and basket extracted  Complications:   None    Procedure and Technique:    After informed consent including the risks of bleeding, infection, ureteral injury, and need for secondary procedures, patient was placed supine in the operating room theater  Gen  anesthesia was administered  They were then placed in the dorsal lithotomy position and sterilely prepped and draped in usual fashion  Antibiotic prophylaxis and DVT prophylaxis were administered Cystoscopy was performed the 21 Sloven[place] cystoscope 30° lens  This revealed a normal urethra  Inspection of the bladder revealed no abnormalities  Fluoroscopy did show evidence of a large stone in left kidney    Attention was turned to the left ureteral orifice   A 5fr open ended catheter was attempted to be passed into the ureteral orifice  A retrograde ureteropyelogram was performed showing normal ureter and collecting system with stone overtaking left lower pole  Then a 0 38 solo guidewire was passed through the open ended catheter and up the ureter into the renal pelvis  The scope was removed and reintroduced and a second solo wire was passed up the ureter into the renal pelvis  An 12/15 x 45cm access sheath was then sequentially placed (inner sheath and then inner + outer sheath) over one of the wires under fluoroscopic guidance with minimal resistance  A Wi one time use flexible ureteroscope was then passed through the access sheath  The stone was encountered in the left lower pole and was moderately impacted here  This was fragmented with the use of a 270 micron holmium laser fiber at settings of 0 2J and 50Hz and 0 8J and 10Hz  With fragmentation there was some mild bleeding has the stone broke away from its impacted aspects in the lower pole  Sequential passes were then made with a 1 9fr zero tip wire basket in order to retrieve stone debris  The collecting system was examined again and no significant residual stone fragments were appreciated  Fluoroscopy also did not show any evidence of residual stone  Hemostasis was appropriate  The ureteroscope was backed down the ureter under vision and there were no residual fragments and the ureter was noted to be intact with no injury (there was some mucosal petechiae in the mid ureter from access sheath)  A retrograde was performed from proximal ureter and appeared unremarkable without extravasation of contrast     Cystoscope was reassembled over the guidewire and a 6 x 26 double-J stent inserted without difficulty with good coil seen in left collecting system on fluoroscopy and visually in the bladder  The string was left on and secured to the penis  The bladder was drained     The patient was placed back supine, awakened from general anesthesia and brought to recovery room in stable condition  A qualified resident physician was not available    Patient Disposition:  PACU     PLAN:  Stent will be removed via string in 1 week    Plan for KUB and ultrasound to follow in 6 weeks    SIGNATURE: Betzaida Long MD  DATE: August 18, 2022  TIME: 2:09 PM

## 2022-08-18 NOTE — TELEPHONE ENCOUNTER
Pt had left RIRS for large stone  Plan to remove stent via string in 1 week    Then KUB and US 6 weeks later

## 2022-08-18 NOTE — INTERVAL H&P NOTE
H&P reviewed  After examining the patient I find no changes in the patients condition since the H&P had been written  Vitals:    08/18/22 1016   BP: 127/83   Pulse: 65   Resp: 18   Temp: (!) 97 3 °F (36 3 °C)   SpO2: 100%     I personally read the patient's CT scan  We discussed that his lower pole stones may not be causing his pain so surgery may not help pain  Also base location of stones that may be difficult to reach in require more than 1 surgery or PCNL      Urine culture negative

## 2022-08-18 NOTE — ANESTHESIA PREPROCEDURE EVALUATION
Procedure:  CYSTOSCOPY URETEROSCOPY WITH LITHOTRIPSY HOLMIUM LASER, RETROGRADE PYELOGRAM AND INSERTION STENT URETERAL (Left Bladder)    Relevant Problems   ANESTHESIA (within normal limits)      CARDIO   (-) HTN (hypertension)   (-) Hyperlipidemia      ENDO   (-) Diabetes mellitus, type 2 (HCC)   (-) Hyperthyroidism   (-) Hypothyroidism      GI/HEPATIC   (-) Gastroesophageal reflux disease      /RENAL   (+) Renal calculus, left      HEMATOLOGY (within normal limits)      MUSCULOSKELETAL (within normal limits)      NEURO/PSYCH   (+) History of hepatitis C      PULMONARY   (-) Asthma   (-) Sleep apnea        TTE 4/18/2022  Interpretation Summary         Left Ventricle: Left ventricular cavity size is normal  Wall thickness is normal  The left ventricular ejection fraction is 55%  Systolic function is normal  Wall motion is normal  Diastolic function is normal       Physical Exam    Airway    Mallampati score: II  TM Distance: >3 FB  Neck ROM: full     Dental   No notable dental hx     Cardiovascular      Pulmonary      Other Findings        Anesthesia Plan  ASA Score- 2     Anesthesia Type- general with ASA Monitors  Additional Monitors:   Airway Plan: ETT  Plan Factors-Exercise tolerance (METS): >4 METS  Chart reviewed  EKG reviewed  Existing labs reviewed  Patient summary reviewed  Patient is not a current smoker  Induction- intravenous  Postoperative Plan- Plan for postoperative opioid use  Informed Consent- Anesthetic plan and risks discussed with patient  I personally reviewed this patient with the CRNA  Discussed and agreed on the Anesthesia Plan with the CRNA  Sheryl Franco

## 2022-08-19 ENCOUNTER — APPOINTMENT (EMERGENCY)
Dept: CT IMAGING | Facility: HOSPITAL | Age: 32
End: 2022-08-19
Payer: COMMERCIAL

## 2022-08-19 ENCOUNTER — NURSE TRIAGE (OUTPATIENT)
Dept: OTHER | Facility: OTHER | Age: 32
End: 2022-08-19

## 2022-08-19 ENCOUNTER — HOSPITAL ENCOUNTER (EMERGENCY)
Facility: HOSPITAL | Age: 32
Discharge: HOME | End: 2022-08-20
Attending: EMERGENCY MEDICINE
Payer: COMMERCIAL

## 2022-08-19 VITALS
HEIGHT: 69 IN | HEART RATE: 72 BPM | WEIGHT: 203 LBS | BODY MASS INDEX: 30.07 KG/M2 | DIASTOLIC BLOOD PRESSURE: 99 MMHG | TEMPERATURE: 97.8 F | RESPIRATION RATE: 16 BRPM | OXYGEN SATURATION: 99 % | SYSTOLIC BLOOD PRESSURE: 151 MMHG

## 2022-08-19 DIAGNOSIS — G89.18 POSTOPERATIVE PAIN: Primary | ICD-10-CM

## 2022-08-19 LAB
ANION GAP SERPL CALCULATED.3IONS-SCNC: 10 MMOL/L (ref 4–13)
BASOPHILS # BLD AUTO: 0.04 THOUSANDS/ΜL (ref 0–0.1)
BASOPHILS NFR BLD AUTO: 0 % (ref 0–1)
BUN SERPL-MCNC: 16 MG/DL (ref 5–25)
CALCIUM SERPL-MCNC: 8.5 MG/DL (ref 8.3–10.1)
CHLORIDE SERPL-SCNC: 105 MMOL/L (ref 96–108)
CO2 SERPL-SCNC: 27 MMOL/L (ref 21–32)
CREAT SERPL-MCNC: 1.05 MG/DL (ref 0.6–1.3)
EOSINOPHIL # BLD AUTO: 0.03 THOUSAND/ΜL (ref 0–0.61)
EOSINOPHIL NFR BLD AUTO: 0 % (ref 0–6)
ERYTHROCYTE [DISTWIDTH] IN BLOOD BY AUTOMATED COUNT: 12.2 % (ref 11.6–15.1)
GFR SERPL CREATININE-BSD FRML MDRD: 92 ML/MIN/1.73SQ M
GLUCOSE SERPL-MCNC: 89 MG/DL (ref 65–140)
HCT VFR BLD AUTO: 46.2 % (ref 36.5–49.3)
HGB BLD-MCNC: 15.6 G/DL (ref 12–17)
IMM GRANULOCYTES # BLD AUTO: 0.05 THOUSAND/UL (ref 0–0.2)
IMM GRANULOCYTES NFR BLD AUTO: 0 % (ref 0–2)
LYMPHOCYTES # BLD AUTO: 3.7 THOUSANDS/ΜL (ref 0.6–4.47)
LYMPHOCYTES NFR BLD AUTO: 26 % (ref 14–44)
MCH RBC QN AUTO: 31.1 PG (ref 26.8–34.3)
MCHC RBC AUTO-ENTMCNC: 33.8 G/DL (ref 31.4–37.4)
MCV RBC AUTO: 92 FL (ref 82–98)
MONOCYTES # BLD AUTO: 1.15 THOUSAND/ΜL (ref 0.17–1.22)
MONOCYTES NFR BLD AUTO: 8 % (ref 4–12)
NEUTROPHILS # BLD AUTO: 9.16 THOUSANDS/ΜL (ref 1.85–7.62)
NEUTS SEG NFR BLD AUTO: 66 % (ref 43–75)
NRBC BLD AUTO-RTO: 0 /100 WBCS
PLATELET # BLD AUTO: 230 THOUSANDS/UL (ref 149–390)
PMV BLD AUTO: 9.8 FL (ref 8.9–12.7)
POTASSIUM SERPL-SCNC: 3.7 MMOL/L (ref 3.5–5.3)
RBC # BLD AUTO: 5.02 MILLION/UL (ref 3.88–5.62)
SODIUM SERPL-SCNC: 142 MMOL/L (ref 135–147)
WBC # BLD AUTO: 14.13 THOUSAND/UL (ref 4.31–10.16)

## 2022-08-19 PROCEDURE — G1004 CDSM NDSC: HCPCS

## 2022-08-19 PROCEDURE — 99284 EMERGENCY DEPT VISIT MOD MDM: CPT

## 2022-08-19 PROCEDURE — 96374 THER/PROPH/DIAG INJ IV PUSH: CPT

## 2022-08-19 PROCEDURE — 85025 COMPLETE CBC W/AUTO DIFF WBC: CPT

## 2022-08-19 PROCEDURE — 80048 BASIC METABOLIC PNL TOTAL CA: CPT

## 2022-08-19 PROCEDURE — 99285 EMERGENCY DEPT VISIT HI MDM: CPT

## 2022-08-19 PROCEDURE — 74176 CT ABD & PELVIS W/O CONTRAST: CPT

## 2022-08-19 PROCEDURE — 96375 TX/PRO/DX INJ NEW DRUG ADDON: CPT

## 2022-08-19 PROCEDURE — 36415 COLL VENOUS BLD VENIPUNCTURE: CPT

## 2022-08-19 RX ORDER — KETOROLAC TROMETHAMINE 30 MG/ML
15 INJECTION, SOLUTION INTRAMUSCULAR; INTRAVENOUS ONCE
Status: COMPLETED | OUTPATIENT
Start: 2022-08-19 | End: 2022-08-19

## 2022-08-19 RX ORDER — HYDROMORPHONE HCL IN WATER/PF 6 MG/30 ML
0.2 PATIENT CONTROLLED ANALGESIA SYRINGE INTRAVENOUS ONCE
Status: COMPLETED | OUTPATIENT
Start: 2022-08-19 | End: 2022-08-19

## 2022-08-19 RX ADMIN — HYDROMORPHONE HYDROCHLORIDE 0.2 MG: 0.2 INJECTION, SOLUTION INTRAMUSCULAR; INTRAVENOUS; SUBCUTANEOUS at 23:39

## 2022-08-19 RX ADMIN — KETOROLAC TROMETHAMINE 15 MG: 30 INJECTION, SOLUTION INTRAMUSCULAR; INTRAVENOUS at 23:03

## 2022-08-19 NOTE — TELEPHONE ENCOUNTER
Spoke with patient and reviewed common stent symptoms and expectations  Patient has had previous stents but not one with string  He is not comfortable removing it himself  Due to no availability at the [facility] office, patient was scheduled at [facility] office for stent with string removal on 8/25/22 @ 1030  Office address was provided to patient  He is also scheduled for 6 wk f/u to review KUB & US results at the [facility] office on 10/10/22  Patient verbalized understanding and agrees to plan

## 2022-08-19 NOTE — TELEPHONE ENCOUNTER
Patient reported that he called this morning   String was very tight and causing a lot of discomfort  When he spoke to the RN this morning was advised to loosen the string so it would not pull   Patient loosened the string and took a nap when he woke up the string has a large gap of string and blood is at the tip of penis  Not able to void for the last hour  TT provider Rakan Bejarano   "He does not need to go to the ER unless he really cannot void for a long period of time (4-6 hrs)  The amount of string seen is not a big deal unless he mistakenly tugged on the string which may have pulled the stent out some'>      Advised patient of providers recommendation , with Ed precautions if unable to void and increased amount of blood drainage  Patient verbalized understanding and agreement

## 2022-08-19 NOTE — TELEPHONE ENCOUNTER
Reason for Disposition   Unable to urinate (or only a few drops) and bladder feels very full   Blood in urine (red, pink, or tea-colored)    Answer Assessment - Initial Assessment Questions  1  SYMPTOM: "What's the main symptom you're concerned about?" (e g , discharge from penis, rash, pain, itching, swelling)      Bleeding from penis  2  LOCATION: "Where is the bleeding located?"      penis  3  ONSET: "When did bleeding  start?"      This afternoon  4  PAIN: "Is there any pain?" If Yes, ask: "How bad is it?"  (Scale 1-10; or mild, moderate, severe)      8/10  5  URINE: "Any difficulty passing urine?" If Yes, ask: "When was the last time?"      No  6  CAUSE: "What do you think is causing the symptoms?"      Stent with wire   7  OTHER SYMPTOMS: "Do you have any other symptoms?" (e g , fever, abdominal pain, blood in urine)      Blood in urine, unable to urinate for the last hour      Protocols used: PENIS AND SCROTUM [facility]

## 2022-08-19 NOTE — TELEPHONE ENCOUNTER
Regarding: Uro Sent problem  ----- Message from Zhanna Hunt RN sent at 8/19/2022  3:47 PM EDT -----  I had a sent put in and the wire was sticking out  This morning the sent wire was tight  and now it's cutting into my penis  It's painful and bleeding

## 2022-08-20 RX ORDER — TRAMADOL HYDROCHLORIDE 50 MG/1
50 TABLET ORAL EVERY 6 HOURS PRN
Qty: 5 TABLET | Refills: 0 | Status: SHIPPED | OUTPATIENT
Start: 2022-08-20 | End: 2022-08-30

## 2022-08-20 RX ORDER — LIDOCAINE HYDROCHLORIDE 20 MG/ML
1 JELLY TOPICAL ONCE
Status: COMPLETED | OUTPATIENT
Start: 2022-08-20 | End: 2022-08-20

## 2022-08-20 RX ADMIN — LIDOCAINE HYDROCHLORIDE 1 APPLICATION: 20 JELLY TOPICAL at 02:30

## 2022-08-20 NOTE — ED PROVIDER NOTES
History  Chief Complaint   Patient presents with    Post-op Problem     Pt had lithotripsy yesterday at [facility] [place] and feels his stent is falling out and that the wire is cutting his penis tip, unable to void x 5 hours  Denies n/v     Patient is a 33 YOM who presents to the ED post-op day 1 following left uteroscopy with lithotripsy and stent placement at [address]  He states that the tip of the stent string was very taught and was mildly lacerating his meatus earlier today, at that time he had no other pain or discomfort and was voiding without issue  Patient states that earlier today he woke up from a nap and noticed blood coming from the tip of his penis with associated lower abdominal pain and left flank pain  He states at that time he also noticed slack on his stent string that was not present prior to his nap  He states since earlier he has been unable to void, despite trying multiple times  He rates his current pain 8-9/10  He denies any fevers/chills, headaches, lightheadedness, chest pain, SOB  Prior to Admission Medications   Prescriptions Last Dose Informant Patient Reported? Taking?    cefdinir (OMNICEF) 300 mg capsule   No No   Sig: Take 1 capsule (300 mg total) by mouth every 12 (twelve) hours for 3 days Start the day before stent removal so that you are finishing the day after stent removal   fluticasone (FLONASE) 50 mcg/act nasal spray  Self Yes No   Si spray into each nostril as needed     oxybutynin (DITROPAN) 5 mg tablet   No No   Sig: Take 1 tablet (5 mg total) by mouth 3 (three) times a day as needed (bladder spasms) for up to 10 days      Facility-Administered Medications: None       Past Medical History:   Diagnosis Date    Anxiety disorder 10/17/2012    COVID-19 2022    Depression 10/17/2012    situational  around mother's death    History of hepatitis C     Kidney stone     Substance abuse ([facility] [address] )     clean since ; pain killers and heroin    Umbilical pain        Past Surgical History:   Procedure Laterality Date    CYSTOSCOPY W/ URETERAL STENT PLACEMENT  2012    in [address] [facility] W/ URETEROSCOPY W/ LITHOTRIPSY Left 2022    dr Yue Ash &INDWELL STENT INSRT Left 2022    Procedure: CYSTOSCOPY URETEROSCOPY WITH LITHOTRIPSY HOLMIUM LASER, RETROGRADE PYELOGRAM AND INSERTION STENT URETERAL;  Surgeon: Scottie Steele MD;  [facility];  Service: Urology    [place] REPAIR UMBILICAL AKYX,8+I/Y,JSXTB N/A 2022    Procedure: REPAIR HERNIA UMBILICAL OPEN;  Surgeon: Hugo Valdez MD;  Location: [facility];  Service: General    WRIST FRACTURE SURGERY Right        Family History   Problem Relation Age of Onset    Leukemia Mother     Depression Father     Lupus Maternal Aunt     Lupus Cousin     Substance Abuse Neg Hx     Alcohol abuse Neg Hx      I have reviewed and agree with the history as documented  E-Cigarette/Vaping    E-Cigarette Use Never User      E-Cigarette/Vaping Substances    Nicotine No     THC No     CBD No     Flavoring No     Other No     Unknown No      Social History     Tobacco Use    Smoking status: Former Smoker     Packs/day: 1 00     Years: 10 00     Pack years: 10 00     Types: Cigarettes     Quit date:      Years since quittin 6    Smokeless tobacco: Never Used   Vaping Use    Vaping Use: Never used   Substance Use Topics    Alcohol use: Not Currently    Drug use: Not Currently     Types: Heroin, Oxycodone     Comment: clean since ; pain killers and heroin       Review of Systems   Genitourinary: Positive for difficulty urinating, enuresis (Reports being unable to void since earlier today) and flank pain (left flank pain)  All other systems reviewed and are negative  Physical Exam  Physical Exam  Vitals and nursing note reviewed  Constitutional:       General: He is not in acute distress       Appearance: Normal appearance  He is well-developed  Comments: Patient appears in pain at bedside  Unable to find comfortable position  HENT:      Head: Normocephalic and atraumatic  Eyes:      Conjunctiva/sclera: Conjunctivae normal    Cardiovascular:      Rate and Rhythm: Normal rate and regular rhythm  Heart sounds: No murmur heard  Pulmonary:      Effort: Pulmonary effort is normal  No respiratory distress  Breath sounds: Normal breath sounds  Abdominal:      Palpations: Abdomen is soft  Tenderness: There is abdominal tenderness in the right lower quadrant, suprapubic area and left lower quadrant  There is left CVA tenderness  There is no right CVA tenderness  Musculoskeletal:      Cervical back: Neck supple  Skin:     General: Skin is warm and dry  Neurological:      General: No focal deficit present  Mental Status: He is alert and oriented to person, place, and time     Psychiatric:         Mood and Affect: Mood normal          Behavior: Behavior normal          Vital Signs  ED Triage Vitals [08/19/22 1752]   Temperature Pulse Respirations Blood Pressure SpO2   97 8 °F (36 6 °C) 98 16 143/90 100 %      Temp Source Heart Rate Source Patient Position - Orthostatic VS BP Location FiO2 (%)   Temporal Monitor Sitting Left arm --      Pain Score       6           Vitals:    08/19/22 1752 08/19/22 2150   BP: 143/90 151/99   Pulse: 98 72   Patient Position - Orthostatic VS: Sitting Lying         Visual Acuity      ED Medications  Medications   ketorolac (TORADOL) injection 15 mg (15 mg Intravenous Given 8/19/22 2303)   HYDROmorphone HCl (DILAUDID) injection 0 2 mg (0 2 mg Intravenous Given 8/19/22 2339)       Diagnostic Studies  Results Reviewed     Procedure Component Value Units Date/Time    Basic metabolic panel [[id number]] Collected: 08/19/22 2302    Lab Status: Final result Specimen: Blood from Arm, Left Updated: 08/19/22 2334     Sodium 142 mmol/L      Potassium 3 7 mmol/L      Chloride 105 mmol/L      CO2 27 mmol/L      ANION GAP 10 mmol/L      BUN 16 mg/dL      Creatinine 1 05 mg/dL      Glucose 89 mg/dL      Calcium 8 5 mg/dL      eGFR 92 ml/min/1 73sq m     Narrative:      [place] guidelines for Chronic Kidney Disease (CKD):     Stage 1 with normal or high GFR (GFR > 90 mL/min/1 73 square meters)    Stage 2 Mild CKD (GFR = 60-89 mL/min/1 73 square meters)    Stage 3A Moderate CKD (GFR = 45-59 mL/min/1 73 square meters)    Stage 3B Moderate CKD (GFR = 30-44 mL/min/1 73 square meters)    Stage 4 Severe CKD (GFR = 15-29 mL/min/1 73 square meters)    Stage 5 End Stage CKD (GFR <15 mL/min/1 73 square meters)  Note: GFR calculation is accurate only with a steady state creatinine    CBC and differential [[id number]]  (Abnormal) Collected: 08/19/22 2302    Lab Status: Final result Specimen: Blood from Arm, Left Updated: 08/19/22 2316     WBC 14 13 Thousand/uL      RBC 5 02 Million/uL      Hemoglobin 15 6 g/dL      Hematocrit 46 2 %      MCV 92 fL      MCH 31 1 pg      MCHC 33 8 g/dL      RDW 12 2 %      MPV 9 8 fL      Platelets 435 Thousands/uL      nRBC 0 /100 WBCs      Neutrophils Relative 66 %      Immat GRANS % 0 %      Lymphocytes Relative 26 %      Monocytes Relative 8 %      Eosinophils Relative 0 %      Basophils Relative 0 %      Neutrophils Absolute 9 16 Thousands/µL      Immature Grans Absolute 0 05 Thousand/uL      Lymphocytes Absolute 3 70 Thousands/µL      Monocytes Absolute 1 15 Thousand/µL      Eosinophils Absolute 0 03 Thousand/µL      Basophils Absolute 0 04 Thousands/µL                  CT renal stone study abdomen pelvis without contrast    (Results Pending)              Procedures  Procedures         ED Course  ED Course as of 08/20/22 0006   Fri Aug 19, 2022   2328 Patient reports no improvement in pain levels S/P toradol  Patient is agreeable to a small dose of narcotics due to pain levels  Will order 0 2mg IV dilaudid     2349 Patient states he still feels discomfort but reports improvement in pain levels S/P dilaudid  Sat Aug 20, 2022   0005 Care transferred to Dr Annette Zamora  SBIRT 20yo+    Flowsheet Row Most Recent Value   SBIRT (23 yo +)    In order to provide better care to our patients, we are screening all of our patients for alcohol and drug use  Would it be okay to ask you these screening questions? No Filed at: 08/19/2022 1755                    MDM  Number of Diagnoses or Management Options  Diagnosis management comments: Patient presents to the ED post-op day 1 following left lithotripsy with stent placement  Complaining of inability to void, blood at tip of penis, and lower abdominal/left flank pain  Will order basic labs, pain management, reach out to urology for imaging recommendations and further management  Disposition  Final diagnoses:   None     ED Disposition     None      Follow-up Information    None         Patient's Medications   Discharge Prescriptions    No medications on file       No discharge procedures on file      PDMP Review       Value Time User    PDMP Reviewed  Yes 2/23/2022  1:05 PM Laya Webster PA-C          ED Provider  Electronically Signed by           Mirta Avila PA-C  08/20/22 0006

## 2022-08-21 NOTE — ED ATTENDING ATTESTATION
8/19/2022  ILisandro MD, saw and evaluated the patient  I have discussed the patient with the resident/non-physician practitioner and agree with the resident's/non-physician practitioner's findings, Plan of Care, and MDM as documented in the resident's/non-physician practitioner's note, except where noted  All available labs and Radiology studies were reviewed  I was present for key portions of any procedure(s) performed by the resident/non-physician practitioner and I was immediately available to provide assistance  At this point I agree with the current assessment done in the Emergency Department  I have conducted an independent evaluation of this patient a history and physical is as follows:    ED Course  ED Course as of 08/21/22 0325   Sat Aug 20, 2022   0217 Pt able to urinate  Stent in place  Will d/c home, urojet, pain meds, f/u iwht urology  Pt agrees with plan  RTED instructions given     7692 PMED query complete         Critical Care Time  Procedures

## 2022-08-22 ENCOUNTER — PATIENT MESSAGE (OUTPATIENT)
Dept: UROLOGY | Facility: HOSPITAL | Age: 32
End: 2022-08-22

## 2022-08-22 DIAGNOSIS — N20.0 NEPHROLITHIASIS: Primary | ICD-10-CM

## 2022-08-22 RX ORDER — PHENAZOPYRIDINE HYDROCHLORIDE 200 MG/1
200 TABLET, FILM COATED ORAL
Qty: 10 TABLET | Refills: 0 | Status: SHIPPED | OUTPATIENT
Start: 2022-08-22 | End: 2022-09-30

## 2022-08-22 NOTE — TELEPHONE ENCOUNTER
Please call patient to follow up on symptoms  It appears he did go to ER on 8/19 and received pain medication  Do not see PVR assessment

## 2022-08-24 LAB
CA H2 PHOS DIHYD MFR STONE: 90 %
CALCIUM OXALATE DIHYDRATE MFR STONE IR: 10 %
COLOR STONE: NORMAL
COMMENT-STONE3: NORMAL
COMPOSITION: NORMAL
LABORATORY COMMENT REPORT: NORMAL
PHOTO: NORMAL
SIZE STONE: NORMAL MM
SPEC SOURCE SUBJ: NORMAL
STONE ANALYSIS-IMP: NORMAL
STONE ANALYSIS-IMP: NORMAL
WT STONE: 37 MG

## 2022-08-25 ENCOUNTER — PROCEDURE VISIT (OUTPATIENT)
Dept: UROLOGY | Facility: AMBULATORY SURGERY CENTER | Age: 32
End: 2022-08-25

## 2022-08-25 VITALS
DIASTOLIC BLOOD PRESSURE: 100 MMHG | WEIGHT: 203 LBS | SYSTOLIC BLOOD PRESSURE: 160 MMHG | HEIGHT: 69 IN | BODY MASS INDEX: 30.07 KG/M2 | HEART RATE: 73 BPM

## 2022-08-25 DIAGNOSIS — N20.0 NEPHROLITHIASIS: Primary | ICD-10-CM

## 2022-08-25 PROCEDURE — 99024 POSTOP FOLLOW-UP VISIT: CPT | Performed by: UROLOGY

## 2022-08-25 NOTE — PROGRESS NOTES
8/25/2022  Jessica Hancock is a 33 y o  male  [id number]        Diagnosis  Chief Complaint     Post-op; Stent removal          Patient is s/p left ureteroscopy with stone extraction on 8/18/2022 with Dr Kimberley Duane  Follow up as scheduled on 10/10 with KUB and US  Knows to call the office in the meantime with concerns  Procedure Stent with String Removal    Vitals:    08/25/22 1027   BP: 160/100   Pulse: 73   Weight: 92 1 kg (203 lb)   Height: 5' 9" (1 753 m)       Stent with string removed intact without difficulty  Reviewed post stent removal symptoms including flank pain, dysuria, and hematuria  Instructed patient to increase oral fluid intake  Encouraged the use of NSAIDS and other prescribed pain medication as needed for discomfort  Patient instructed to call the office or report to the ER for uncontrolled pain, fever, chills, nausea or vomiting         Merlin Monaco, RN

## 2022-08-28 ENCOUNTER — NURSE TRIAGE (OUTPATIENT)
Dept: OTHER | Facility: OTHER | Age: 32
End: 2022-08-28

## 2022-08-29 NOTE — TELEPHONE ENCOUNTER
Reason for Disposition   Passing pure blood or large blood clots (i e , size > a dime) (Exception: emelia or small strands)    Answer Assessment - Initial Assessment Questions  1  COLOR of URINE: "Describe the color of the urine "  (e g , tea-colored, pink, red, blood clots, bloody)      Yesterday, patient noticed a little bit of blood in urine  Wasn't sure if pieces of stone or small clots  Today, patient had red blood clots about 1-inch long, about 2-3 that came out  Urine is a little darker in color  Patient had stent removal on 8/25 Thursday  2  ONSET: "When did the bleeding start?"       Patient passed the clots around 2PM    3  EPISODES: "How many times has there been blood in the urine?" or "How many times today?"      Hasn't happened again  4  PAIN with URINATION: "Is there any pain with passing your urine?" If Yes, ask: "How bad is the pain?"  (Scale 1-10; or mild, moderate, severe)     - MILD - complains slightly about urination hurting     - MODERATE - interferes with normal activities       - SEVERE - excruciating, unwilling or unable to urinate because of the pain       Denies  5  FEVER: "Do you have a fever?" If Yes, ask: "What is your temperature, how was it measured, and when did it start?"      Denies fever  6  ASSOCIATED SYMPTOMS: "Are you passing urine more frequently than usual?"      Denies  7  OTHER SYMPTOMS: "Do you have any other symptoms?" (e g , back/flank pain, abdominal pain, vomiting)      Cramps occasionally on left flank, once on right  Denies N/V, but has less of appetite today  Denies abdominal pain  Patient also since passing the clots, the urine stream isn't as strong      Protocols used: URINE - BLOOD IN-ADULT-

## 2022-08-29 NOTE — TELEPHONE ENCOUNTER
Regarding: blood clot post stent removal   ----- Message from [facility] sent at 8/28/2022  7:39 PM EDT -----  "I have Blood clots after stent removal"

## 2022-08-29 NOTE — TELEPHONE ENCOUNTER
TC on call provider regarding passing blood clots  Stated it is typical and advised to stay hydrated and use tylenol/advil for any pain  Provided patient with on call provider's recommendations; verbalized understanding  Asked to call back with any further questions or concerns

## 2022-09-28 PROBLEM — Z87.442 HISTORY OF KIDNEY STONES: Status: ACTIVE | Noted: 2022-01-18

## 2022-09-30 ENCOUNTER — OFFICE VISIT (OUTPATIENT)
Dept: FAMILY MEDICINE CLINIC | Facility: CLINIC | Age: 32
End: 2022-09-30
Payer: COMMERCIAL

## 2022-09-30 VITALS
SYSTOLIC BLOOD PRESSURE: 122 MMHG | DIASTOLIC BLOOD PRESSURE: 78 MMHG | OXYGEN SATURATION: 100 % | HEIGHT: 69 IN | HEART RATE: 84 BPM | BODY MASS INDEX: 30.51 KG/M2 | WEIGHT: 206 LBS | TEMPERATURE: 99.1 F

## 2022-09-30 DIAGNOSIS — K76.0 FATTY LIVER: ICD-10-CM

## 2022-09-30 DIAGNOSIS — R74.01 TRANSAMINASEMIA: ICD-10-CM

## 2022-09-30 DIAGNOSIS — Z87.442 HISTORY OF KIDNEY STONES: Primary | ICD-10-CM

## 2022-09-30 DIAGNOSIS — R73.01 IMPAIRED FASTING GLUCOSE: ICD-10-CM

## 2022-09-30 DIAGNOSIS — Z23 ENCOUNTER FOR IMMUNIZATION: ICD-10-CM

## 2022-09-30 PROCEDURE — 90472 IMMUNIZATION ADMIN EACH ADD: CPT

## 2022-09-30 PROCEDURE — 90746 HEPB VACCINE 3 DOSE ADULT IM: CPT

## 2022-09-30 PROCEDURE — 90471 IMMUNIZATION ADMIN: CPT

## 2022-09-30 PROCEDURE — 90632 HEPA VACCINE ADULT IM: CPT

## 2022-09-30 PROCEDURE — 99213 OFFICE O/P EST LOW 20 MIN: CPT | Performed by: FAMILY MEDICINE

## 2022-09-30 NOTE — PROGRESS NOTES
Name: Rosalia Kirkpatrick      : 1990      MRN: [id number]  Encounter Provider: Reji Hutton DO  Encounter Date: 2022   Encounter department: [address] [place] [address]     1  History of kidney stones  S/p lithotripsy and stent with Dr Mayo Diaz  Doing well  Denies any dysuria, hematuria, flank pain  Has f/u appt with urology scheduled  2  Impaired fasting glucose    3  Transaminasemia  Resolved  4  Fatty liver  Incidental finding seen on imaging (CT scan) done for evaluation of his kidney stones  Discussed importance of diet/exercise for both this and his impaired fasting glucose  5  Encounter for immunization  -     HEPATITIS B VACCINE ADULT IM  -     HEPATITIS A VACCINE ADULT IM           Subjective     HPI   Patient is a 33year old male with impaired fasting glucose, fatty liver and kidney stones here today for routine f/u visit  Saw urology and had lithotripsy and stent 22 with Dr Mayo Diaz  Janee Joyner was sent for analysis (calcium oxalate)  Incidental finding of fatty liver on CT of abdomen and pelvis done 22 for evaluation of his kidney stones  He declines flu vaccine today  Is due for 3rd Hep B vaccine  Is agreeable to getting this today       Review of Systems    Past Medical History:   Diagnosis Date    Anxiety disorder 10/17/2012    COVID-19 2022    Depression 10/17/2012    situational  around mother's death    History of hepatitis C     Kidney stone     Substance abuse ([facility] [address] )     clean since ; pain killers and heroin    Umbilical pain      Past Surgical History:   Procedure Laterality Date    CYSTOSCOPY W/ URETERAL STENT PLACEMENT  2012    in [address] [place] [address] W/ URETEROSCOPY W/ LITHOTRIPSY Left 2022    dr Mary Campuzano  2022    HERNIA REPAIR      [place] CYSTO/URETERO W/LITHOTRIPSY &INDWELL STENT INSRT Left 2022    Procedure: CYSTOSCOPY URETEROSCOPY WITH LITHOTRIPSY HOLMIUM LASER, RETROGRADE PYELOGRAM AND INSERTION STENT URETERAL;  Surgeon: Martin Shrestha MD;  [facility];  Service: Urology    [place] REPAIR UMBILICAL ZDQY,5+E/S,WQOZH N/A 2022    Procedure: REPAIR HERNIA UMBILICAL OPEN;  Surgeon: Murtaza Moran MD;  [facility];  Service: General    WRIST FRACTURE SURGERY Right      Family History   Problem Relation Age of Onset    Leukemia Mother     Depression Father     Lupus Maternal Aunt     Lupus Cousin     Substance Abuse Neg Hx     Alcohol abuse Neg Hx      Social History     Socioeconomic History    Marital status: /Civil Union     Spouse name: None    Number of children: None    Years of education: None    Highest education level: None   Occupational History    None   Tobacco Use    Smoking status: Former Smoker     Packs/day: 1      Years: 10      Pack years: 10      Types: Cigarettes     Quit date:      Years since quittin 7    Smokeless tobacco: Never Used   Vaping Use    Vaping Use: Never used   Substance and Sexual Activity    Alcohol use: Not Currently    Drug use: Not Currently     Types: Heroin, Oxycodone     Comment: clean since ; pain killers and heroin    Sexual activity: Yes     Partners: Female   Other Topics Concern    None   Social History Narrative    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     Social Determinants of Health     Financial Resource Strain: Not on file   Food Insecurity: Not on file   Transportation Needs: Not on file   Physical Activity: Not on file   Stress: Not on file   Social Connections: Not on file   Intimate Partner Violence: Not on file   Housing Stability: Not on file     Current Outpatient Medications on File Prior to Visit   Medication Sig    fluticasone (FLONASE) 50 mcg/act nasal spray 1 spray into each nostril as needed      [DISCONTINUED] oxybutynin (DITROPAN) 5 mg tablet Take 1 tablet (5 mg total) by mouth 3 (three) times a day as needed (bladder spasms) for up to 10 days    [DISCONTINUED] phenazopyridine (PYRIDIUM) 200 mg tablet Take 1 tablet (200 mg total) by mouth 3 (three) times a day with meals (Patient not taking: Reported on 9/30/2022)     No Known Allergies  Immunization History   Administered Date(s) Administered    COVID-19 PFIZER VACCINE 0 3 ML IM 04/26/2021, 05/20/2021    Hep B, adult 03/30/2022, 05/03/2022    INFLUENZA 11/28/2014    Tdap 07/13/2018    Tuberculin Skin Test-PPD Intradermal 06/27/2011       Objective     /78 (BP Location: Left arm, Patient Position: Sitting, Cuff Size: Standard)   Pulse 84   Temp 99 1 °F (37 3 °C) (Tympanic)   Ht 5' 9" (1 753 m)   Wt 93 4 kg (206 lb)   SpO2 100%   BMI 30 42 kg/m²     Physical Exam  Vitals and nursing note reviewed  Constitutional:       General: He is not in acute distress  Appearance: Normal appearance  He is not ill-appearing, toxic-appearing or diaphoretic  HENT:      Head: Normocephalic and atraumatic  Eyes:      Conjunctiva/sclera: Conjunctivae normal    Cardiovascular:      Rate and Rhythm: Normal rate and regular rhythm  Pulses: Normal pulses  Heart sounds: No murmur heard  Pulmonary:      Effort: Pulmonary effort is normal       Breath sounds: Normal breath sounds  Abdominal:      General: Abdomen is flat  Bowel sounds are normal       Palpations: Abdomen is soft  Tenderness: There is no right CVA tenderness or left CVA tenderness  Hernia: No hernia is present  Musculoskeletal:         General: Normal range of motion  Cervical back: Normal range of motion and neck supple  Right lower leg: No edema  Left lower leg: No edema  Lymphadenopathy:      Cervical: No cervical adenopathy  Skin:     General: Skin is warm and dry  Neurological:      General: No focal deficit present  Mental Status: He is alert and oriented to person, place, and time     Psychiatric:         Mood and Affect: Mood normal        Daryl Ibanez DO

## 2022-10-25 ENCOUNTER — OFFICE VISIT (OUTPATIENT)
Dept: FAMILY MEDICINE CLINIC | Facility: CLINIC | Age: 32
End: 2022-10-25

## 2022-10-25 VITALS
TEMPERATURE: 99.2 F | HEART RATE: 88 BPM | DIASTOLIC BLOOD PRESSURE: 72 MMHG | BODY MASS INDEX: 29.15 KG/M2 | HEIGHT: 70 IN | WEIGHT: 203.6 LBS | SYSTOLIC BLOOD PRESSURE: 116 MMHG | OXYGEN SATURATION: 98 %

## 2022-10-25 DIAGNOSIS — L65.9 ALOPECIA: ICD-10-CM

## 2022-10-25 DIAGNOSIS — K21.9 GASTROESOPHAGEAL REFLUX DISEASE, UNSPECIFIED WHETHER ESOPHAGITIS PRESENT: ICD-10-CM

## 2022-10-25 DIAGNOSIS — R07.9 CHEST PAIN, UNSPECIFIED TYPE: Primary | ICD-10-CM

## 2022-10-25 RX ORDER — PANTOPRAZOLE SODIUM 20 MG/1
20 TABLET, DELAYED RELEASE ORAL
Qty: 30 TABLET | Refills: 5 | Status: SHIPPED | OUTPATIENT
Start: 2022-10-25 | End: 2022-11-04

## 2022-10-25 NOTE — PROGRESS NOTES
Name: Michaela Vigil      : 1990      MRN: [id number]  Encounter Provider: Sean Dominguez DO  Encounter Date: 10/25/2022   Encounter department: [address] [place] [address]  Chest pain, unspecified type  -     Stress test only, exercise; Future; Expected date: 10/25/2022  -     Ambulatory Referral to Gastroenterology; Future  Reviewed ED note, ekg's, cxr report, CTA head/neck report and labs which were all normal    No cardiovascular risk factors  His pain is likely non-cardiac but will get treadmill stress test and await cardiology input  I suspect that his pain is GI related given that it occurs when he lies down at night  Will treat with pantoprazole 20 mg daily and refer to GI for further evaluation  2  Alopecia  -     Antinuclear Antibodies (TABATHA), IFA; Future  -     Antinuclear Antibodies (TABATHA), IFA    3  Gastroesophageal reflux disease, unspecified whether esophagitis present  -     pantoprazole (PROTONIX) 20 mg tablet; Take 1 tablet (20 mg total) by mouth daily before breakfast  -     Ambulatory Referral to Gastroenterology; Future           Subjective     HPI   Patient is a 33year old male with history of hep c, kidney stones, fatty liver here today for evaluation of chest pain  Seen in ED at [facility] on 10/18/22 for complaint of chest pain, diaphoresis and dizziness  States that he woke up soon after falling asleep to let his dog out  Became very lightheaded  When he lied back down, developed chest pain, diaphoresis and cold, clammy hands prompting him to call ambulance  EKG in ED was normal  CXR in ED showed no acute cardiopulmonary disease  Troponins negative x 2  D-dimer negative  CBC normal  CMP normal    He also had CTA of head and neck which showed no acute abnormalities  He was advised to f/u with cardiology  He is awaiting their call  Since then has felt fine up until last night when the pain started again   Pain was while lying down and is described as a belt around his chest  There is no associated shortness of breath or nausea  Feels a little lightheaded at times  Pain is made better by getting up and walking around  There was some associated reflux today  Has really never had reflux before  He did take an otc acid reducer earlier today which did help  Also reports having area of hair loss in beard area that he just noticed  No rash there prior  It "just appeared"       Review of Systems    Past Medical History:   Diagnosis Date   • Anxiety disorder 10/17/2012   • COVID-19 01/06/2022   • Depression 10/17/2012    situational  around mother's death   • Dizziness 10/18/2022    ED visit to [facility] for chest pain, diaphoresis and dizziness  CTA head neck negative   CXR nl  ekg normal  d-dimer -; troponins -   • History of hepatitis C    • Kidney stone    • Substance abuse ([address] )     clean since 2015; pain killers and heroin   • Umbilical pain      Past Surgical History:   Procedure Laterality Date   • CYSTOSCOPY W/ URETERAL STENT PLACEMENT  2012    in [facility]   • CYSTOSCOPY W/ URETEROSCOPY W/ LITHOTRIPSY Left 08/18/2022    dr lynch   • FL RETROGRADE PYELOGRAM  8/18/2022   • HERNIA REPAIR     • MT CYSTO/URETERO W/LITHOTRIPSY &INDWELL STENT INSRT Left 8/18/2022    Procedure: CYSTOSCOPY URETEROSCOPY WITH LITHOTRIPSY HOLMIUM LASER, RETROGRADE PYELOGRAM AND INSERTION STENT URETERAL;  Surgeon: Lyn Sierra MD;  [facility];  Service: Urology   • MT REPAIR UMBILICAL EVHO,8+H/G,BQVIX N/A 02/23/2022    Procedure: REPAIR HERNIA UMBILICAL OPEN;  Surgeon: Felpie Thomas MD;  Location: [facility] MAIN [facility];  Service: General   • WRIST FRACTURE SURGERY Right      Family History   Problem Relation Age of Onset   • Leukemia Mother    • Depression Father    • Lupus Maternal Aunt    • Lupus Cousin    • Substance Abuse Neg Hx    • Alcohol abuse Neg Hx      Social History     Socioeconomic History   • Marital status: /Civil Union     Spouse name: None   • Number of children: None   • Years of education: None   • Highest education level: None   Occupational History   • None   Tobacco Use   • Smoking status: Former Smoker     Packs/day: 1 00     Years: 10 00     Pack years: 10 00     Types: Cigarettes     Quit date: 2016     Years since quittin 8   • Smokeless tobacco: Never Used   Vaping Use   • Vaping Use: Never used   Substance and Sexual Activity   • Alcohol use: Not Currently   • Drug use: Not Currently     Types: Heroin, Oxycodone     Comment: clean since ; pain killers and heroin   • Sexual activity: Yes     Partners: Female   Other Topics Concern   • None   Social History Narrative    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     Social Determinants of Health     Financial Resource Strain: Not on file   Food Insecurity: Not on file   Transportation Needs: Not on file   Physical Activity: Not on file   Stress: Not on file   Social Connections: Not on file   Intimate Partner Violence: Not on file   Housing Stability: Not on file     Current Outpatient Medications on File Prior to Visit   Medication Sig   • fluticasone (FLONASE) 50 mcg/act nasal spray 1 spray into each nostril as needed       No Known Allergies  Immunization History   Administered Date(s) Administered   • COVID-19 PFIZER VACCINE 0 3 ML IM 2021, 2021   • Hep A, adult 2022   • Hep B, adult 2022, 2022, 2022   • INFLUENZA 2014   • Tdap 2018   • Tuberculin Skin Test-PPD Intradermal 2011       Objective     /72 (BP Location: Left arm, Patient Position: Sitting, Cuff Size: Large)   Pulse 88   Temp 99 2 °F (37 3 °C) (Tympanic)   Ht 5' 10" (1 778 m)   Wt 92 4 kg (203 lb 9 6 oz)   SpO2 98%   BMI 29 21 kg/m²     Physical Exam  Vitals and nursing note reviewed  Constitutional:       General: He is not in acute distress  Appearance: Normal appearance  He is not ill-appearing, toxic-appearing or diaphoretic     HENT: Head: Normocephalic  Right Ear: Tympanic membrane normal       Left Ear: Tympanic membrane normal       Nose: Nose normal       Mouth/Throat:      Mouth: Mucous membranes are moist       Pharynx: No oropharyngeal exudate or posterior oropharyngeal erythema  Eyes:      Conjunctiva/sclera: Conjunctivae normal    Neck:      Vascular: No carotid bruit  Cardiovascular:      Rate and Rhythm: Normal rate and regular rhythm  Heart sounds: No murmur heard  Pulmonary:      Effort: Pulmonary effort is normal       Breath sounds: Normal breath sounds  Abdominal:      General: Abdomen is flat  Bowel sounds are normal  There is no distension  Palpations: Abdomen is soft  There is no mass  Tenderness: There is no abdominal tenderness  Musculoskeletal:      Cervical back: Normal range of motion and neck supple  Right lower leg: No edema  Left lower leg: No edema  Comments: Some tenderness on palpation of left sternal border but this does not reproduce his pain    No calf tenderness or swelling   Lymphadenopathy:      Cervical: No cervical adenopathy  Skin:     Comments:  1 cm area of alopecia left submandibular region of neck   Neurological:      General: No focal deficit present  Mental Status: He is alert and oriented to person, place, and time         Alan Bejarano DO

## 2022-10-26 ENCOUNTER — TELEPHONE (OUTPATIENT)
Dept: NEPHROLOGY | Facility: CLINIC | Age: 32
End: 2022-10-26

## 2022-10-26 NOTE — TELEPHONE ENCOUNTER
LM with Dr Diana Chiu office for an insurance referral for 11/3 appt  Given dx code of N20 0  asked that completed referral be faxed to our office

## 2022-10-27 LAB — ANA TITR SER IF: NEGATIVE {TITER}

## 2022-10-28 LAB
BACTERIA UR CULT: NORMAL
Lab: NORMAL

## 2022-10-31 DIAGNOSIS — K21.9 GASTROESOPHAGEAL REFLUX DISEASE, UNSPECIFIED WHETHER ESOPHAGITIS PRESENT: Primary | ICD-10-CM

## 2022-10-31 RX ORDER — SUCRALFATE ORAL 1 G/10ML
1 SUSPENSION ORAL
Qty: 414 ML | Refills: 0 | Status: SHIPPED | OUTPATIENT
Start: 2022-10-31 | End: 2023-02-14

## 2022-11-02 ENCOUNTER — TELEPHONE (OUTPATIENT)
Dept: NEPHROLOGY | Facility: CLINIC | Age: 32
End: 2022-11-02

## 2022-11-02 NOTE — TELEPHONE ENCOUNTER
Patient inform me that he is having heart and lung issues and asked to cancel his appointment on 11/3/22 at 10:30

## 2022-11-04 ENCOUNTER — CONSULT (OUTPATIENT)
Dept: GASTROENTEROLOGY | Facility: CLINIC | Age: 32
End: 2022-11-04

## 2022-11-04 ENCOUNTER — TELEPHONE (OUTPATIENT)
Dept: GASTROENTEROLOGY | Facility: CLINIC | Age: 32
End: 2022-11-04

## 2022-11-04 VITALS
HEIGHT: 70 IN | TEMPERATURE: 98.4 F | DIASTOLIC BLOOD PRESSURE: 80 MMHG | SYSTOLIC BLOOD PRESSURE: 126 MMHG | WEIGHT: 203 LBS | BODY MASS INDEX: 29.06 KG/M2

## 2022-11-04 DIAGNOSIS — R19.4 CHANGE IN BOWEL HABIT: ICD-10-CM

## 2022-11-04 DIAGNOSIS — R07.9 CHEST PAIN, UNSPECIFIED TYPE: ICD-10-CM

## 2022-11-04 DIAGNOSIS — K21.9 GASTROESOPHAGEAL REFLUX DISEASE, UNSPECIFIED WHETHER ESOPHAGITIS PRESENT: Primary | ICD-10-CM

## 2022-11-04 DIAGNOSIS — K76.0 FATTY LIVER: ICD-10-CM

## 2022-11-04 PROBLEM — K42.9 UMBILICAL HERNIA: Status: ACTIVE | Noted: 2022-11-04

## 2022-11-04 RX ORDER — PANTOPRAZOLE SODIUM 20 MG/1
40 TABLET, DELAYED RELEASE ORAL 2 TIMES DAILY
Qty: 120 TABLET | Refills: 7 | Status: SHIPPED | OUTPATIENT
Start: 2022-11-04 | End: 2022-11-10

## 2022-11-04 RX ORDER — HYDROXYZINE HYDROCHLORIDE 25 MG/1
25-50 TABLET, FILM COATED ORAL
COMMUNITY
Start: 2022-10-31 | End: 2022-11-11 | Stop reason: SDUPTHER

## 2022-11-04 NOTE — TELEPHONE ENCOUNTER
Our mutual patient is scheduled for procedure: EGD      On: _11_/_17  _/_22  _      With:   _Nusrat______    Our office is requesting cardiac clearance for their upcoming procedure        Physician Approving clearance: ________________________

## 2022-11-04 NOTE — PATIENT INSTRUCTIONS
Impression: Other secondary cataract, bilateral: H26.493. Plan: Give Rx for glass and instructed on normal adaptation period. Discussed diagnosis in detail with patient. No treatment is required at this time. Will continue to observe condition and or symptoms. Call if [address] worsens. Scheduled date of EGD(as of today): 11/17/2022  Physician performing EGD: Dr Ameya Uribe   Location of EGD: [facility]  Instructions reviewed with patient by: MR  Clearances:  Cardiac Clearance

## 2022-11-06 NOTE — PROGRESS NOTES
[facility] - Outpatient Consultation  Delmi Conway Host 33 y o  male MRN: [id number]  Encounter: [id number]          ASSESSMENT AND PLAN:      1  Chest pain, unspecified type  2  Gastroesophageal reflux disease, unspecified whether esophagitis present  - Ambulatory Referral to Gastroenterology  - EGD; Future  - pantoprazole (PROTONIX) 20 mg tablet; Take 2 tablets (40 mg total) by mouth 2 (two) times a day  Dispense: 120 tablet; Refill: 7    3  Fatty liver  4  Change in bowel habit  - Celiac Disease Panel; Future  - H  pylori antigen, stool; Future  - Calprotectin,Fecal; Future  - Celiac Disease Panel  - H  pylori antigen, stool  - Calprotectin,Fecal  - Ova and parasite examination; Future  - Giardia antigen; Future  - Clostridium difficile toxin by PCR; Future  - Ova and parasite examination  - Giardia antigen  - Clostridium difficile toxin by PCR  Recommend weight loss on a yearly basis of at least 8-10%  Avoidance of fatty foods and adopting healthy lifestyle  Recent LFTs within normal limits  Reflux measures  He has atypical symptoms of reflux as well as atypical symptoms cardiac related chest pain  If cardiac evaluation was negative EGD will be planned for further evaluation      ______________________________________________________________________    HPI:    He presents for evaluation for atypical chest pain for cardiac and gastrointestinal origin  Symptoms are sometimes related to oral intake of food but pain is not classical as it is not burning and goes from the left side of the armpit to the right side  He is undergoing cardiac evaluation  Antacid and anti reflux meds have not completely alleviated symptoms  He also does have some symptoms of acid reflux such as sour taste in his mouth  The symptoms have been ongoing for about 3 weeks now  His BMI is slightly elevated to 29 and has history of fatty liver disease  No prior endoscopic evaluation    He also has symptoms compatible with IBS D  Family history of colorectal cancer  REVIEW OF SYSTEMS:    CONSTITUTIONAL: Denies any fever, chills, rigors, and weight loss  HEENT: No earache or tinnitus  Denies hearing loss or visual disturbances  CARDIOVASCULAR: No chest pain or palpitations  RESPIRATORY: Denies any cough, hemoptysis, shortness of breath or dyspnea on exertion  GASTROINTESTINAL: As noted in the History of Present Illness  GENITOURINARY: No problems with urination  Denies any hematuria or dysuria  NEUROLOGIC: No dizziness or vertigo, denies headaches  MUSCULOSKELETAL: Denies any muscle or joint pain  SKIN: Denies skin rashes or itching  ENDOCRINE: Denies excessive thirst  Denies intolerance to heat or cold  PSYCHOSOCIAL: Denies depression or anxiety  Denies any recent memory loss  Historical Information   Past Medical History:   Diagnosis Date   • Anxiety disorder 10/17/2012   • COVID-19 01/06/2022   • Depression 10/17/2012    situational  around mother's death   • Dizziness 10/18/2022    ED visit to [place] for chest pain, diaphoresis and dizziness  CTA head neck negative   CXR nl  ekg normal  d-dimer -; troponins -   • History of hepatitis C    • Kidney stone    • Substance abuse ([facility] [address] )     clean since 2015; pain killers and heroin   • Umbilical pain      Past Surgical History:   Procedure Laterality Date   • CYSTOSCOPY W/ URETERAL STENT PLACEMENT  2012    in [facility]   • CYSTOSCOPY W/ URETEROSCOPY W/ LITHOTRIPSY Left 08/18/2022    dr lynch   • FL RETROGRADE PYELOGRAM  8/18/2022   • HERNIA REPAIR     • [place] CYSTO/URETERO W/LITHOTRIPSY &INDWELL STENT INSRT Left 8/18/2022    Procedure: CYSTOSCOPY URETEROSCOPY WITH LITHOTRIPSY HOLMIUM LASER, RETROGRADE PYELOGRAM AND INSERTION STENT URETERAL;  Surgeon: Elena Hawthorne MD;  Location[facility] MAIN OR;  Service: Urology   • NC REPAIR UMBILICAL JVPF,2+B/X,FTULH N/A 02/23/2022    Procedure: REPAIR HERNIA UMBILICAL OPEN;  Surgeon: Velasquez Marsh MD;  [facility];  Service: General   • WRIST FRACTURE SURGERY Right      Social History   Social History     Substance and Sexual Activity   Alcohol Use Not Currently     Social History     Substance and Sexual Activity   Drug Use Not Currently   • Types: Heroin, Oxycodone    Comment: clean since ; pain killers and heroin     Social History     Tobacco Use   Smoking Status Former Smoker   • Packs/day: 1 00   • Years: 10 00   • Pack years: 10 00   • Types: Cigarettes   • Quit date:    • Years since quittin 8   Smokeless Tobacco Never Used     Family History   Problem Relation Age of Onset   • Leukemia Mother    • Depression Father    • Lupus Maternal Aunt    • Lupus Cousin    • Substance Abuse Neg Hx    • Alcohol abuse Neg Hx        Meds/Allergies       Current Outpatient Medications:   •  fluticasone (FLONASE) 50 mcg/act nasal spray  •  hydrOXYzine HCL (ATARAX) 25 mg tablet  •  pantoprazole (PROTONIX) 20 mg tablet  •  sucralfate (CARAFATE) 1 g/10 mL suspension    No Known Allergies        Objective     Blood pressure 126/80, temperature 98 4 °F (36 9 °C), temperature source Tympanic, height 5' 10" (1 778 m), weight 92 1 kg (203 lb)  Body mass index is 29 13 kg/m²  PHYSICAL EXAM:      General Appearance:   Alert, cooperative, no distress   HEENT:   Normocephalic, atraumatic, anicteric      Neck:  Supple, symmetrical, trachea midline   Lungs:   Clear to auscultation bilaterally; no rales, rhonchi or wheezing; respirations unlabored    Heart[de-identified]   Regular rate and rhythm; no murmur, rub, or gallop  Abdomen:   Soft, non-tender, non-distended; normal bowel sounds; no masses, no organomegaly    Genitalia:   Deferred    Rectal:   Deferred    Extremities:  No cyanosis, clubbing or edema    Pulses:  2+ and symmetric    Skin:  No jaundice, rashes, or lesions    Lymph nodes:  No palpable cervical lymphadenopathy        Lab Results:   No visits with results within 1 Day(s) from this visit     Latest known visit with results is:   Office Visit on 10/25/2022   Component Date Value   • Antinuclear Antibodies, * 10/26/2022 Negative          Radiology Results:   No results found

## 2022-11-06 NOTE — H&P (VIEW-ONLY)
[facility] - Outpatient Consultation  Maykel Antoine 33 y o  male MRN: [id number]  Encounter: [id number]          ASSESSMENT AND PLAN:      1  Chest pain, unspecified type  2  Gastroesophageal reflux disease, unspecified whether esophagitis present  - Ambulatory Referral to Gastroenterology  - EGD; Future  - pantoprazole (PROTONIX) 20 mg tablet; Take 2 tablets (40 mg total) by mouth 2 (two) times a day  Dispense: 120 tablet; Refill: 7    3  Fatty liver  4  Change in bowel habit  - Celiac Disease Panel; Future  - H  pylori antigen, stool; Future  - Calprotectin,Fecal; Future  - Celiac Disease Panel  - H  pylori antigen, stool  - Calprotectin,Fecal  - Ova and parasite examination; Future  - Giardia antigen; Future  - Clostridium difficile toxin by PCR; Future  - Ova and parasite examination  - Giardia antigen  - Clostridium difficile toxin by PCR  Recommend weight loss on a yearly basis of at least 8-10%  Avoidance of fatty foods and adopting healthy lifestyle  Recent LFTs within normal limits  Reflux measures  He has atypical symptoms of reflux as well as atypical symptoms cardiac related chest pain  If cardiac evaluation was negative EGD will be planned for further evaluation      ______________________________________________________________________    HPI:    He presents for evaluation for atypical chest pain for cardiac and gastrointestinal origin  Symptoms are sometimes related to oral intake of food but pain is not classical as it is not burning and goes from the left side of the armpit to the right side  He is undergoing cardiac evaluation  Antacid and anti reflux meds have not completely alleviated symptoms  He also does have some symptoms of acid reflux such as sour taste in his mouth  The symptoms have been ongoing for about 3 weeks now  His BMI is slightly elevated to 29 and has history of fatty liver disease  No prior endoscopic evaluation    He also has symptoms compatible with IBS D  Family history of colorectal cancer  REVIEW OF SYSTEMS:    CONSTITUTIONAL: Denies any fever, chills, rigors, and weight loss  HEENT: No earache or tinnitus  Denies hearing loss or visual disturbances  CARDIOVASCULAR: No chest pain or palpitations  RESPIRATORY: Denies any cough, hemoptysis, shortness of breath or dyspnea on exertion  GASTROINTESTINAL: As noted in the History of Present Illness  GENITOURINARY: No problems with urination  Denies any hematuria or dysuria  NEUROLOGIC: No dizziness or vertigo, denies headaches  MUSCULOSKELETAL: Denies any muscle or joint pain  SKIN: Denies skin rashes or itching  ENDOCRINE: Denies excessive thirst  Denies intolerance to heat or cold  PSYCHOSOCIAL: Denies depression or anxiety  Denies any recent memory loss  Historical Information   Past Medical History:   Diagnosis Date   • Anxiety disorder 10/17/2012   • COVID-19 01/06/2022   • Depression 10/17/2012    situational  around mother's death   • Dizziness 10/18/2022    ED visit to [place] for chest pain, diaphoresis and dizziness  CTA head neck negative   CXR nl  ekg normal  d-dimer -; troponins -   • History of hepatitis C    • Kidney stone    • Substance abuse ([facility] [address] )     clean since 2015; pain killers and heroin   • Umbilical pain      Past Surgical History:   Procedure Laterality Date   • CYSTOSCOPY W/ URETERAL STENT PLACEMENT  2012    in [facility]   • CYSTOSCOPY W/ URETEROSCOPY W/ LITHOTRIPSY Left 08/18/2022    dr lynch   • FL RETROGRADE PYELOGRAM  8/18/2022   • HERNIA REPAIR     • [place] CYSTO/URETERO W/LITHOTRIPSY &INDWELL STENT INSRT Left 8/18/2022    Procedure: CYSTOSCOPY URETEROSCOPY WITH LITHOTRIPSY HOLMIUM LASER, RETROGRADE PYELOGRAM AND INSERTION STENT URETERAL;  Surgeon: Airam Contreras MD;  [facility] MA[facility];  Service: Urology   • [place] REPAIR UMBILICAL NBZS,1+M/M,BUGWE N/A 02/23/2022    Procedure: REPAIR HERNIA UMBILICAL OPEN;  Surgeon: Apple Dc MD;  [facility]: [facility]IN [facility];  Service: General   • WRIST FRACTURE SURGERY Right      Social History   Social History     Substance and Sexual Activity   Alcohol Use Not Currently     Social History     Substance and Sexual Activity   Drug Use Not Currently   • Types: Heroin, Oxycodone    Comment: clean since ; pain killers and heroin     Social History     Tobacco Use   Smoking Status Former Smoker   • Packs/day: 1 00   • Years: 10 00   • Pack years: 10 00   • Types: Cigarettes   • Quit date:    • Years since quittin 8   Smokeless Tobacco Never Used     Family History   Problem Relation Age of Onset   • Leukemia Mother    • Depression Father    • Lupus Maternal Aunt    • Lupus Cousin    • Substance Abuse Neg Hx    • Alcohol abuse Neg Hx        Meds/Allergies       Current Outpatient Medications:   •  fluticasone (FLONASE) 50 mcg/act nasal spray  •  hydrOXYzine HCL (ATARAX) 25 mg tablet  •  pantoprazole (PROTONIX) 20 mg tablet  •  sucralfate (CARAFATE) 1 g/10 mL suspension    No Known Allergies        Objective     Blood pressure 126/80, temperature 98 4 °F (36 9 °C), temperature source Tympanic, height 5' 10" (1 778 m), weight 92 1 kg (203 lb)  Body mass index is 29 13 kg/m²  PHYSICAL EXAM:      General Appearance:   Alert, cooperative, no distress   HEENT:   Normocephalic, atraumatic, anicteric      Neck:  Supple, symmetrical, trachea midline   Lungs:   Clear to auscultation bilaterally; no rales, rhonchi or wheezing; respirations unlabored    Heart[de-identified]   Regular rate and rhythm; no murmur, rub, or gallop  Abdomen:   Soft, non-tender, non-distended; normal bowel sounds; no masses, no organomegaly    Genitalia:   Deferred    Rectal:   Deferred    Extremities:  No cyanosis, clubbing or edema    Pulses:  2+ and symmetric    Skin:  No jaundice, rashes, or lesions    Lymph nodes:  No palpable cervical lymphadenopathy        Lab Results:   No visits with results within 1 Day(s) from this visit     Latest known visit with results is:   Office Visit on 10/25/2022   Component Date Value   • Antinuclear Antibodies, * 10/26/2022 Negative          Radiology Results:   No results found

## 2022-11-07 DIAGNOSIS — F41.9 ANXIETY: Primary | ICD-10-CM

## 2022-11-07 RX ORDER — BUSPIRONE HYDROCHLORIDE 5 MG/1
5 TABLET ORAL 2 TIMES DAILY
Qty: 60 TABLET | Refills: 5 | Status: SHIPPED | OUTPATIENT
Start: 2022-11-07 | End: 2022-12-01

## 2022-11-08 ENCOUNTER — HOSPITAL ENCOUNTER (OUTPATIENT)
Dept: NON INVASIVE DIAGNOSTICS | Age: 32
Discharge: HOME | End: 2022-11-08

## 2022-11-08 ENCOUNTER — TELEPHONE (OUTPATIENT)
Dept: GASTROENTEROLOGY | Facility: CLINIC | Age: 32
End: 2022-11-08

## 2022-11-08 DIAGNOSIS — R07.9 CHEST PAIN, UNSPECIFIED TYPE: ICD-10-CM

## 2022-11-08 LAB
BASELINE ST DEPRESSION: 0 MM
CHEST PAIN STATEMENT: NORMAL
MAX DIASTOLIC BP: 82 MMHG
MAX HEART RATE: 171 BPM
MAX HR PERCENT: 91 %
MAX HR: 171 BPM
MAX PREDICTED HEART RATE: 187 BPM
MAX. SYSTOLIC BP: 166 MMHG
PROTOCOL NAME: NORMAL
RATE PRESSURE PRODUCT: NORMAL
REASON FOR TERMINATION: NORMAL
SL CV STRESS STAGE REACHED: 3
STRESS ANGINA INDEX: 1
STRESS BASELINE HR: 107 BPM
STRESS DUKE TREADMILL SCORE: 5
STRESS PEAK HR: 171 BPM
STRESS POST ESTIMATED WORKLOAD: 10.1 METS
STRESS POST EXERCISE DUR MIN: 9 MIN
STRESS POST EXERCISE DUR SEC: 0 SEC
STRESS POST PEAK BP: 166 MMHG
STRESS ST DEPRESSION: 0 MM
TARGET HR FORMULA: NORMAL
TEST INDICATION: NORMAL
TIME IN EXERCISE PHASE: NORMAL

## 2022-11-08 NOTE — TELEPHONE ENCOUNTER
Per request of Casandra Crawford at endoscopy center  Spoke with s/o as voice mail was full and advised that we need results of stool studies particularly C diff prior to egd scheduled for 11/17/2022

## 2022-11-08 NOTE — TELEPHONE ENCOUNTER
pantoprazole (PROTONIX) 20 mg tablet; Take 2 tablets (40 mg total) by mouth 2 (two) times a day  Dispense: 120 tablets refills:7     Received prior auth, could you rewrite the script? 40mg two times a day? Please advise

## 2022-11-09 ENCOUNTER — TELEPHONE (OUTPATIENT)
Dept: FAMILY MEDICINE CLINIC | Facility: CLINIC | Age: 32
End: 2022-11-09

## 2022-11-09 NOTE — TELEPHONE ENCOUNTER
Patient completed treadmill stress test which was negative for ischemia  May proceed with gi workup as planned

## 2022-11-09 NOTE — TELEPHONE ENCOUNTER
----- Message from Sharyn Garza DO sent at 11/9/2022  8:27 AM EST -----  Can let patient know that his stress test was negative for ischemia/blockage   Can proceed with further GI workup as planned

## 2022-11-10 DIAGNOSIS — K21.9 GASTROESOPHAGEAL REFLUX DISEASE, UNSPECIFIED WHETHER ESOPHAGITIS PRESENT: Primary | ICD-10-CM

## 2022-11-10 RX ORDER — PANTOPRAZOLE SODIUM 40 MG/1
40 TABLET, DELAYED RELEASE ORAL 2 TIMES DAILY
Qty: 60 TABLET | Refills: 6 | Status: SHIPPED | OUTPATIENT
Start: 2022-11-10 | End: 2022-11-14

## 2022-11-11 ENCOUNTER — PATIENT MESSAGE (OUTPATIENT)
Dept: UROLOGY | Facility: HOSPITAL | Age: 32
End: 2022-11-11

## 2022-11-11 DIAGNOSIS — F41.9 ANXIETY: Primary | ICD-10-CM

## 2022-11-11 RX ORDER — HYDROXYZINE HYDROCHLORIDE 25 MG/1
25-50 TABLET, FILM COATED ORAL
Qty: 30 TABLET | Refills: 1 | Status: SHIPPED | OUTPATIENT
Start: 2022-11-11 | End: 2022-11-18

## 2022-11-11 NOTE — TELEPHONE ENCOUNTER
Spoke to patient  Specimens dropped off at [facility] in [facility] yesterday

## 2022-11-14 DIAGNOSIS — K21.9 GASTROESOPHAGEAL REFLUX DISEASE, UNSPECIFIED WHETHER ESOPHAGITIS PRESENT: ICD-10-CM

## 2022-11-14 LAB
CALPROTECTIN STL-MCNT: 51 UG/G (ref 0–120)
ENDOMYSIUM IGA SER QL: NEGATIVE
H PYLORI AG STL QL IA: NEGATIVE
IGA SERPL-MCNC: 285 MG/DL (ref 90–386)
TTG IGA SER-ACNC: <2 U/ML (ref 0–3)

## 2022-11-14 RX ORDER — PANTOPRAZOLE SODIUM 20 MG/1
40 TABLET, DELAYED RELEASE ORAL 2 TIMES DAILY
Qty: 120 TABLET | Refills: 7 | Status: SHIPPED | OUTPATIENT
Start: 2022-11-14 | End: 2022-11-15 | Stop reason: CLARIF

## 2022-11-15 DIAGNOSIS — K21.9 GASTROESOPHAGEAL REFLUX DISEASE, UNSPECIFIED WHETHER ESOPHAGITIS PRESENT: Primary | ICD-10-CM

## 2022-11-15 RX ORDER — PANTOPRAZOLE SODIUM 40 MG/1
40 TABLET, DELAYED RELEASE ORAL 2 TIMES DAILY
Qty: 60 TABLET | Refills: 3 | Status: SHIPPED | OUTPATIENT
Start: 2022-11-15

## 2022-11-16 ENCOUNTER — ANESTHESIA (OUTPATIENT)
Dept: ANESTHESIOLOGY | Facility: AMBULATORY SURGERY CENTER | Age: 32
End: 2022-11-16

## 2022-11-16 ENCOUNTER — ANESTHESIA EVENT (OUTPATIENT)
Dept: ANESTHESIOLOGY | Facility: AMBULATORY SURGERY CENTER | Age: 32
End: 2022-11-16

## 2022-11-16 RX ORDER — SODIUM CHLORIDE, SODIUM LACTATE, POTASSIUM CHLORIDE, CALCIUM CHLORIDE 600; 310; 30; 20 MG/100ML; MG/100ML; MG/100ML; MG/100ML
125 INJECTION, SOLUTION INTRAVENOUS CONTINUOUS
Status: CANCELLED | OUTPATIENT
Start: 2022-11-16

## 2022-11-17 ENCOUNTER — ANESTHESIA EVENT (OUTPATIENT)
Dept: GASTROENTEROLOGY | Facility: AMBULATORY SURGERY CENTER | Age: 32
End: 2022-11-17

## 2022-11-17 ENCOUNTER — ANESTHESIA (OUTPATIENT)
Dept: GASTROENTEROLOGY | Facility: AMBULATORY SURGERY CENTER | Age: 32
End: 2022-11-17

## 2022-11-17 ENCOUNTER — HOSPITAL ENCOUNTER (OUTPATIENT)
Dept: GASTROENTEROLOGY | Facility: AMBULATORY SURGERY CENTER | Age: 32
Discharge: HOME | End: 2022-11-17

## 2022-11-17 VITALS
HEART RATE: 64 BPM | RESPIRATION RATE: 22 BRPM | HEIGHT: 70 IN | SYSTOLIC BLOOD PRESSURE: 105 MMHG | TEMPERATURE: 99 F | OXYGEN SATURATION: 100 % | DIASTOLIC BLOOD PRESSURE: 74 MMHG | BODY MASS INDEX: 29.06 KG/M2 | WEIGHT: 203 LBS

## 2022-11-17 DIAGNOSIS — K21.9 GASTROESOPHAGEAL REFLUX DISEASE, UNSPECIFIED WHETHER ESOPHAGITIS PRESENT: ICD-10-CM

## 2022-11-17 RX ORDER — SODIUM CHLORIDE, SODIUM LACTATE, POTASSIUM CHLORIDE, CALCIUM CHLORIDE 600; 310; 30; 20 MG/100ML; MG/100ML; MG/100ML; MG/100ML
125 INJECTION, SOLUTION INTRAVENOUS CONTINUOUS
Status: DISCONTINUED | OUTPATIENT
Start: 2022-11-17 | End: 2022-11-21 | Stop reason: HOSPADM

## 2022-11-17 RX ORDER — PROPOFOL 10 MG/ML
INJECTION, EMULSION INTRAVENOUS AS NEEDED
Status: DISCONTINUED | OUTPATIENT
Start: 2022-11-17 | End: 2022-11-17

## 2022-11-17 RX ORDER — SODIUM CHLORIDE, SODIUM LACTATE, POTASSIUM CHLORIDE, CALCIUM CHLORIDE 600; 310; 30; 20 MG/100ML; MG/100ML; MG/100ML; MG/100ML
50 INJECTION, SOLUTION INTRAVENOUS CONTINUOUS
Status: DISCONTINUED | OUTPATIENT
Start: 2022-11-17 | End: 2022-11-21 | Stop reason: HOSPADM

## 2022-11-17 RX ORDER — LIDOCAINE HYDROCHLORIDE 20 MG/ML
INJECTION, SOLUTION EPIDURAL; INFILTRATION; INTRACAUDAL; PERINEURAL AS NEEDED
Status: DISCONTINUED | OUTPATIENT
Start: 2022-11-17 | End: 2022-11-17

## 2022-11-17 RX ADMIN — PROPOFOL 100 MG: 10 INJECTION, EMULSION INTRAVENOUS at 09:17

## 2022-11-17 RX ADMIN — SODIUM CHLORIDE, SODIUM LACTATE, POTASSIUM CHLORIDE, CALCIUM CHLORIDE: 600; 310; 30; 20 INJECTION, SOLUTION INTRAVENOUS at 09:04

## 2022-11-17 RX ADMIN — PROPOFOL 200 MG: 10 INJECTION, EMULSION INTRAVENOUS at 09:15

## 2022-11-17 RX ADMIN — PROPOFOL 100 MG: 10 INJECTION, EMULSION INTRAVENOUS at 09:19

## 2022-11-17 RX ADMIN — LIDOCAINE HYDROCHLORIDE 100 MG: 20 INJECTION, SOLUTION EPIDURAL; INFILTRATION; INTRACAUDAL; PERINEURAL at 09:15

## 2022-11-17 RX ADMIN — SODIUM CHLORIDE, SODIUM LACTATE, POTASSIUM CHLORIDE, CALCIUM CHLORIDE 50 ML/HR: 600; 310; 30; 20 INJECTION, SOLUTION INTRAVENOUS at 09:08

## 2022-11-17 NOTE — ANESTHESIA PREPROCEDURE EVALUATION
Procedure:  PRE-OP ONLY    Relevant Problems   GI/HEPATIC   (+) Fatty liver   (+) Gastroesophageal reflux disease      NEURO/PSYCH   (+) History of hepatitis C     Anxiety/depression   Recent stress test negative         Anesthesia Plan  ASA Score- 2     Anesthesia Type- IV sedation with anesthesia with ASA Monitors  Additional Monitors:   Airway Plan:           Plan Factors-    Chart reviewed  Patient summary reviewed  Patient is not a current smoker  Patient did not smoke on day of surgery  Induction- intravenous      Postoperative Plan-     Informed Consent-

## 2022-11-17 NOTE — ANESTHESIA PREPROCEDURE EVALUATION
Procedure:  EGD    Relevant Problems   CARDIO   (+) Chest pain      GI/HEPATIC   (+) Fatty liver   (+) Gastroesophageal reflux disease      NEURO/PSYCH   (+) History of hepatitis C   (+) History of kidney stones     Anxiety/depression   Recent stress test negative    Physical Exam    Airway    Mallampati score: II  TM Distance: >3 FB  Neck ROM: full     Dental   No notable dental hx     Cardiovascular      Pulmonary      Other Findings        Anesthesia Plan  ASA Score- 2     Anesthesia Type- IV sedation with anesthesia with ASA Monitors  Additional Monitors:   Airway Plan:           Plan Factors-    Chart reviewed  Patient summary reviewed  Patient is not a current smoker  Patient did not smoke on day of surgery  Induction- intravenous  Postoperative Plan-     Informed Consent- Anesthetic plan and risks discussed with patient  I personally reviewed this patient with the CRNA  Discussed and agreed on the Anesthesia Plan with the CRNA  Majo Swann

## 2022-11-17 NOTE — ANESTHESIA POSTPROCEDURE EVALUATION
Post-Op Assessment Note    CV Status:  Stable  Pain Score: 0    Pain management: adequate     Mental Status:  Arousable and sleepy   Hydration Status:  Stable   PONV Controlled:  Controlled   Airway Patency:  Patent      Post Op Vitals Reviewed: Yes      Staff: CRNA         No notable events documented      BP   121/77   Temp 97 6   Pulse 79   Resp 14   SpO2 99

## 2022-11-18 DIAGNOSIS — F41.9 ANXIETY: ICD-10-CM

## 2022-11-18 RX ORDER — HYDROXYZINE HYDROCHLORIDE 25 MG/1
25-50 TABLET, FILM COATED ORAL
Qty: 180 TABLET | Refills: 1 | Status: SHIPPED | OUTPATIENT
Start: 2022-11-18

## 2022-11-20 ENCOUNTER — TELEPHONE (OUTPATIENT)
Dept: UROLOGY | Facility: HOSPITAL | Age: 32
End: 2022-11-20

## 2022-11-20 DIAGNOSIS — N20.0 NEPHROLITHIASIS: Primary | ICD-10-CM

## 2022-11-21 DIAGNOSIS — R06.09 DOE (DYSPNEA ON EXERTION): ICD-10-CM

## 2022-11-21 DIAGNOSIS — R07.9 CHEST PAIN, UNSPECIFIED TYPE: Primary | ICD-10-CM

## 2022-11-21 DIAGNOSIS — R00.2 PALPITATIONS: ICD-10-CM

## 2022-11-21 LAB
C DIFF TOX GENS STL QL NAA+PROBE: NEGATIVE
G LAMBLIA AG STL QL IA: NEGATIVE
Lab: NORMAL
O+P STL CONC: NORMAL

## 2022-11-21 NOTE — TELEPHONE ENCOUNTER
Patient reported passing a metallic appearing foreign object in his urine  History of stones and ureteroscopy august 2022  Instructed patient to bring it into the office for analysis

## 2022-11-30 DIAGNOSIS — F41.9 ANXIETY: ICD-10-CM

## 2022-12-01 RX ORDER — BUSPIRONE HYDROCHLORIDE 5 MG/1
TABLET ORAL
Qty: 180 TABLET | Refills: 2 | Status: SHIPPED | OUTPATIENT
Start: 2022-12-01

## 2022-12-04 NOTE — TELEPHONE ENCOUNTER
[facility] EMERGENCY DEPT  [address] [place] [id number]  [phone]    Work/School Note    Date: 12/4/2022    To Whom It May concern:    Joelle Sharma was seen and treated today in the emergency room by the following provider(s):  Attending Provider: Meera Patton DO. Joelle Sharma is excused from work/school on 12/4/2022 through 12/6/2022. She is medically clear to return to work/school on 12/7/2022.          Sincerely,          Cassandra Guzman So, I can see in his chart that he did " email" me but I did not get it on my task list and cannot reply to it  Please let him know that his labs are all normal but they did not do the Hep c  Otherwise all is good

## 2022-12-30 DIAGNOSIS — K21.9 GASTROESOPHAGEAL REFLUX DISEASE, UNSPECIFIED WHETHER ESOPHAGITIS PRESENT: ICD-10-CM

## 2022-12-30 RX ORDER — PANTOPRAZOLE SODIUM 40 MG/1
TABLET, DELAYED RELEASE ORAL
Qty: 180 TABLET | Refills: 2 | Status: SHIPPED | OUTPATIENT
Start: 2022-12-30

## 2023-02-13 ENCOUNTER — TELEPHONE (OUTPATIENT)
Dept: OTHER | Facility: OTHER | Age: 33
End: 2023-02-13

## 2023-02-13 NOTE — TELEPHONE ENCOUNTER
Per patient, she is still experiencing severe chest and neck pain since October  He would like to schedule a f/u appt

## 2023-02-14 ENCOUNTER — OFFICE VISIT (OUTPATIENT)
Dept: FAMILY MEDICINE CLINIC | Facility: CLINIC | Age: 33
End: 2023-02-14

## 2023-02-14 VITALS
TEMPERATURE: 98.7 F | WEIGHT: 206.8 LBS | DIASTOLIC BLOOD PRESSURE: 74 MMHG | HEART RATE: 63 BPM | OXYGEN SATURATION: 98 % | SYSTOLIC BLOOD PRESSURE: 108 MMHG | HEIGHT: 70 IN | BODY MASS INDEX: 29.6 KG/M2

## 2023-02-14 DIAGNOSIS — R07.89 CHEST PAIN, MUSCULOSKELETAL: ICD-10-CM

## 2023-02-14 DIAGNOSIS — M54.12 CERVICAL RADICULOPATHY: Primary | ICD-10-CM

## 2023-02-14 RX ORDER — METHOCARBAMOL 500 MG/1
500 TABLET, FILM COATED ORAL 3 TIMES DAILY
Qty: 60 TABLET | Refills: 1 | Status: SHIPPED | OUTPATIENT
Start: 2023-02-14

## 2023-02-14 NOTE — PROGRESS NOTES
Name: Ladonna Hunt      : 1990      MRN: [id number]  Encounter Provider: Andre Carrera DO  Encounter Date: 2023   Encounter department: [address] [place] [address]  Cervical radiculopathy  -     Ambulatory Referral to Physical Therapy; Future  -     methocarbamol (ROBAXIN) 500 mg tablet; Take 1 tablet (500 mg total) by mouth 3 (three) times a day  Refer PT  Trial robaxin 500 mg tid prn  Cautioned on sedating side effects  Will f/u in 4 weeks  Consider MRI or pain management referral if sx persist      2  Chest pain, musculoskeletal  -     Ambulatory Referral to Physical Therapy; Future  -     methocarbamol (ROBAXIN) 500 mg tablet; Take 1 tablet (500 mg total) by mouth 3 (three) times a day    Patient with chronic chest pain, now radiating to left axillary region and shoulder  Has had EKG, CXR, troponins and d-dimer at time of ED visit in October  Treadmill stress test in November was negative  GI workup (EGD) showed some mild antral gastritis but otherwise unremarkable  Pain radiates into left shoulder and neck and is burning in nature  Suspect musculoskeletal etiology         Subjective     HPI     Patient is a 33year old male with history of hep c, kidney stones, fatty liver and GERD here today for complaint of ongoing chest pain  This pain has been going on since October  Patient has had extensive work-up for this chest pain thus far  He feels that the pain waxes and wanes but has persisted and seems to be "spreading  to other locations" and worsening in severity over the last week, prompting him to call for this appt       He was initially seen in ED at [facility] on 10/18/22 for complaint of chest pain, diaphoresis and dizziness  (This occurred after he woke up soon after falling asleep to let his dog out  Became very lightheaded   When he lied back down, developed chest pain, diaphoresis and cold, clammy hands prompting him to call ambulance)       EKG in ED was normal  CXR in ED showed no acute cardiopulmonary disease  Troponins negative x 2  D-dimer negative  CBC normal  CMP normal    He also had CTA of head and neck which showed no acute abnormalities  I saw him in f/u here in office on 10/25/22 and sent him for a treadmill stress test which was completed on 11/8/22 and showed no evidence of ischemia  Saw GI in consultation to rule out GI etiology of his CP and had EGD done on 11/17/22 which showed:  IMPRESSION:  Mild antral gastritis  Biopsies taken to exclude H pylori  Normal esophagus  Biopsies taken of the distal esophagus to evaluate for esophagitis  Normal proximal stomach  Normal duodenum    Today, states that the pain is located in left axillary region and is "stabbing" in nature  There is also pain in anterior chest wall on left side which feels more "burning" in nature and seems to radiate up into his left shoulder and upper back  Left upper back feels "tight"  Some pain in left side of neck and this neck pain radiates into left shoulder/uppper arm  Left arm feels weaker than it used to  Sometimes gets numbness and tingling  No swelling of affected arm  No associated shortness of breath, palpitations, dizziness  No aggravating factors  Taking ibuprofen with some relief  Review of Systems    Past Medical History:   Diagnosis Date   • Anxiety disorder 10/17/2012   • Chest pain     seen in ED at [facility] 10/2022; CTA head and neck negative  troponins negative  EKG normal  nuclear stress test 11/2022 negative   • COVID-19 01/06/2022   • Depression 10/17/2012    situational  around mother's death   • Dizziness 10/18/2022    ED visit to [place] for chest pain, diaphoresis and dizziness  CTA head neck negative   CXR nl  ekg normal  d-dimer -; troponins -   • History of hepatitis C    • Kidney stone    • Substance abuse ([address] )     clean since 2015; pain killers and heroin   • Umbilical pain      Past Surgical History: Procedure Laterality Date   • CYSTOSCOPY W/ URETERAL STENT PLACEMENT      in [facility]   • CYSTOSCOPY W/ URETEROSCOPY W/ LITHOTRIPSY Left 2022    dr lynch   • EGD  2022    normal esophagus  erythematous mucosa in the antrum   normal mucosa in fundus   • FL RETROGRADE PYELOGRAM  2022   • HERNIA REPAIR     • VA CYSTO/URETERO W/LITHOTRIPSY &INDWELL STENT INSRT Left 2022    Procedure: CYSTOSCOPY URETEROSCOPY WITH LITHOTRIPSY HOLMIUM LASER, RETROGRADE PYELOGRAM AND INSERTION STENT URETERAL;  Surgeon: Nataly Neumann MD;  Location[facility] MA[facility];  Service: Urology   • VA RPR UMBILICAL HRNA 5 YRS/> REDUCIBLE N/A 2022    Procedure: REPAIR HERNIA UMBILICAL OPEN;  Surgeon: Hortensia Parsons MD;  Location: [facility] MAIN OR;  Service: General   • WRIST FRACTURE SURGERY Right      Family History   Problem Relation Age of Onset   • Leukemia Mother    • Depression Father    • Lupus Maternal Aunt    • Lupus Cousin    • Substance Abuse Neg Hx    • Alcohol abuse Neg Hx      Social History     Socioeconomic History   • Marital status: /Civil Union     Spouse name: None   • Number of children: None   • Years of education: None   • Highest education level: None   Occupational History   • None   Tobacco Use   • Smoking status: Former     Packs/day: 1 00     Years: 10 00     Pack years: 10 00     Types: Cigarettes     Quit date:      Years since quittin 1   • Smokeless tobacco: Never   Vaping Use   • Vaping Use: Never used   Substance and Sexual Activity   • Alcohol use: Not Currently   • Drug use: Not Currently     Types: Heroin, Oxycodone     Comment: clean since ; pain killers and heroin   • Sexual activity: Yes     Partners: Female   Other Topics Concern   • None   Social History Narrative    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     Social Determinants of Health     Financial Resource Strain: Not on file   Food Insecurity: Not on file   Transportation Needs: Not on file   Physical Activity: Not on file   Stress: Not on file   Social Connections: Not on file   Intimate Partner Violence: Not on file   Housing Stability: Not on file     Current Outpatient Medications on File Prior to Visit   Medication Sig   • busPIRone (BUSPAR) 5 mg tablet TAKE 1 TABLET BY MOUTH TWICE A DAY   • fluticasone (FLONASE) 50 mcg/act nasal spray 1 spray into each nostril as needed     • hydrOXYzine HCL (ATARAX) 25 mg tablet TAKE 1-2 TABLETS (25-50 MG TOTAL) BY MOUTH DAILY AT BEDTIME   • [DISCONTINUED] pantoprazole (PROTONIX) 40 mg tablet TAKE 1 TABLET BY MOUTH TWICE A DAY (Patient not taking: Reported on 2/14/2023)   • [DISCONTINUED] sucralfate (CARAFATE) 1 g/10 mL suspension Take 10 mL (1 g total) by mouth 4 (four) times a day (with meals and at bedtime) (Patient not taking: Reported on 2/14/2023)     No Known Allergies  Immunization History   Administered Date(s) Administered   • COVID-19 PFIZER VACCINE 0 3 ML IM 04/26/2021, 05/20/2021   • Hep A, adult 09/30/2022   • Hep B, adult 03/30/2022, 05/03/2022, 09/30/2022   • INFLUENZA 11/28/2014   • Tdap 07/13/2018   • Tuberculin Skin Test-PPD Intradermal 06/27/2011       Objective     /74 (BP Location: Left arm, Patient Position: Sitting, Cuff Size: Large)   Pulse 63   Temp 98 7 °F (37 1 °C) (Tympanic)   Ht 5' 10" (1 778 m)   Wt 93 8 kg (206 lb 12 8 oz)   SpO2 98%   BMI 29 67 kg/m²     Physical Exam  Vitals and nursing note reviewed  Constitutional:       General: He is not in acute distress  Appearance: He is well-developed  He is not ill-appearing, toxic-appearing or diaphoretic  HENT:      Head: Normocephalic and atraumatic  Mouth/Throat:      Mouth: Mucous membranes are moist       Pharynx: No oropharyngeal exudate or posterior oropharyngeal erythema  Eyes:      Extraocular Movements: Extraocular movements intact  Pupils: Pupils are equal, round, and reactive to light     Cardiovascular:      Rate and Rhythm: Normal rate and regular rhythm  Heart sounds: No murmur heard  Pulmonary:      Effort: Pulmonary effort is normal  No respiratory distress  Breath sounds: Normal breath sounds  No wheezing, rhonchi or rales  Abdominal:      General: Abdomen is flat  Bowel sounds are normal  There is no distension  Palpations: Abdomen is soft  There is no mass  Tenderness: There is no abdominal tenderness  Hernia: No hernia is present  Musculoskeletal:      Cervical back: Normal range of motion and neck supple  Comments: Cervical spine with full range of motion  No spinal tenderness  No paravertebral muscle tenderness  Left trapezius spasm/tenderness and some tenderness on palpation of paravertebral muscles of left upper thoracic paravertebral muscles  He has full range of motion of left shoulder  There is tenderness, reproducing his pain on palpation of axilla  No adenopathy or mass in axillary region  + tenderness on palpation of anterior chest wall on left  Lymphadenopathy:      Cervical: No cervical adenopathy  Skin:     General: Skin is warm and dry  Findings: No rash  Neurological:      General: No focal deficit present  Mental Status: He is alert and oriented to person, place, and time  Cranial Nerves: No cranial nerve deficit  Sensory: No sensory deficit  Motor: No weakness  Deep Tendon Reflexes: Reflexes normal       Comments: Strength in upper extremities is equal bilaterally and 5/5      Psychiatric:         Mood and Affect: Mood normal        Maris Prasad DO

## 2023-02-16 DIAGNOSIS — M79.622 AXILLARY PAIN, LEFT: Primary | ICD-10-CM

## 2023-02-18 ENCOUNTER — PATIENT MESSAGE (OUTPATIENT)
Dept: FAMILY MEDICINE CLINIC | Facility: CLINIC | Age: 33
End: 2023-02-18

## 2023-02-18 DIAGNOSIS — R93.1 ABNORMAL ECHOCARDIOGRAM: ICD-10-CM

## 2023-02-18 DIAGNOSIS — R07.9 CHEST PAIN, UNSPECIFIED TYPE: Primary | ICD-10-CM

## 2023-02-18 DIAGNOSIS — I77.810 ASCENDING AORTA DILATATION (HCC): ICD-10-CM

## 2023-02-23 ENCOUNTER — EVALUATION (OUTPATIENT)
Dept: PHYSICAL THERAPY | Facility: CLINIC | Age: 33
End: 2023-02-23

## 2023-02-23 DIAGNOSIS — R07.89 CHEST PAIN, MUSCULOSKELETAL: ICD-10-CM

## 2023-02-23 DIAGNOSIS — M54.12 CERVICAL RADICULOPATHY: ICD-10-CM

## 2023-02-23 NOTE — PROGRESS NOTES
PT Evaluation     Today's date: 2023  Patient name: Anais Starks  : 1990  MRN: [id number]  Referring provider: Mone Calderon DO  Dx: No diagnosis found  Assessment  Assessment details: Anais Starks is a 27 y o  male presenting to outpatient physical therapy with chief complaints of left cervical pain, chest pain and left axilla pain  Patient presents  with increased pain, decreased thoracic ROM, decreased activity tolerance, poor posture and abnomal movement patterns  Patient's signs and symptoms consistent with possible nerve root impingement in the upper thoracic spine with radiating symptoms into his anterior chest and axilla area  This is resulting in limitations in his ability to sleep at night  And complete his daily activities without pain  Patient would benefit from skilled PT services in order to address his impairments and improve ability to sleep at night and decrease his symptoms  Thank you for the opportunity to share in the care of this patient  Impairments: abnormal or restricted ROM, activity intolerance, lacks appropriate home exercise program and pain with function    Goals  STG to be met in 3  Weeks:  - Decrease neck and axilla pain at its worse to 4-5/10  - Improve upper thoracic mobility with trunk extension   - I with HEP    - Patient will be able to sleep 4-5 hours undisturbed    LTG to be met in 6 weeks:  - Resolve neck and axilla symptoms, no complaints of chest pain  - Sleep throughout night undisturbed  - I with updated HEP   - Improve FOTO score to >= projected outcome         Plan  Plan details:     Patient would benefit from: skilled physical therapy  Planned modality interventions: cryotherapy and thermotherapy: hydrocollator packs  Planned therapy interventions: activity modification, joint mobilization, manual therapy, neuromuscular re-education, body mechanics training, patient education, postural training, strengthening, stretching, therapeutic activities, therapeutic exercise, functional ROM exercises, home exercise program and massage  Frequency: 1x week  Duration in visits: 6  Duration in weeks: 6  Treatment plan discussed with: patient        Subjective Evaluation    History of Present Illness  Mechanism of injury: At Evaluation (2023): Patient is a 27  Y o male referred for outpatient PT services with complaints of neck and chest pain  Patient reports he has been experiencing SOB for about 2 years  Over the past 5 months he has been experiencing increased left cervical pain and chest pain  Patient reports that the pain is located in left axillary region and is "stabbing" in nature  There is also pain in anterior chest wall on left side which feels more "burning" in nature and seems to radiate up into his left shoulder and upper back  Left upper back feels "tight"  Some pain in left side of neck and this neck pain radiates into left shoulder/uppper arm  Left arm feels weaker than it used to  Sometimes gets numbness and tingling  No swelling of affected arm  No associated shortness of breath, palpitations, dizziness  No aggravating factors  Taking ibuprofen with some relief  Patient has had cardiac testing which were negative for MI  Patient does have further testing over the next few weeks  Patient Goals: "I want to figure out what is going on "            Recurrent probem    Quality of life: fair    Pain  Current pain ratin  At worst pain ratin  Location: left cervical area  and upper trap, left axilla, and chest area    Quality: burning and sharp  Relieving factors: change in position  Progression: worsening    Patient Goals  Patient goal: Try to figure out what is going on with my pain  Objective     Concurrent Complaints  Positive for night pain, disturbed sleep and headaches       Postural Observations  Seated posture: good  Standing posture: good        Palpation   Left   Tenderness of the latissimus, pectoralis major, pectoralis minor and rhomboids  Neurological Testing     Sensation   Cervical/Thoracic   Left   Intact: light touch    Right   Intact: light touch    Active Range of Motion   Cervical/Thoracic Spine       Cervical    Subcranial retraction:  WFL   Extension:  WFL  Right lateral flexion:  WFL  Left rotation:  with pain Restriction level: minimal  Right rotation:  Restriction level: minimal    Thoracic    Extension:  Restriction level: moderate    Joint Play     Hypomobile: T1, T2 and T3     Pain: T1, T2 and T3     Strength/Myotome Testing   Cervical Spine     Left   Normal strength    Right   Normal strength    Tests   Cervical   Negative repeated extension and repeated flexion  Left   Negative cervical flexion-rotation test      Thoracic   Negative slump test      Left Shoulder   Negative Adson maneuver, Krystyna Naval TOS and cervical rotation lateral flexion  General Comments:    Upper quarter screen   Shoulder: unremarkable  Elbow: unremarkable  Hand/wrist: unremarkable  Neuro Exam:     Headaches   Patient reports headaches: Yes  Precautions: none    Access Code: [email or web address]  URL: [email or web address]  Date: 02/23/2023  Prepared by: Wiliam Carter    Exercises  • Seated Thoracic Lumbar Extension - 1 x daily - 7 x weekly - 3 sets - 10 reps  • Doorway Pec Stretch at 90 Degrees Abduction - 1 x daily - 7 x weekly - 1 sets - 10 reps - 20 s hold  • Latissimus Dorsi Stretch at Wall - 1 x daily - 7 x weekly - 1 sets - 10 reps - 20 s hold                Manuals 2/23            Upper thoracic mobs             Upper rib mobs left side                                       Neuro Re-Ed             Overhead stretch with deep breaths             Deep breath with weighted resisitance on left side                                                                              Ther Ex             Seated Trunk ext 3x10            Ant chest stretch in door 30" x3 Lat stretch against door 30" x3            Open Book stretch             Scapular retraction             T band rows/ pull down                                       Ther Activity                                       Gait Training                                       Modalities

## 2023-02-27 ENCOUNTER — APPOINTMENT (OUTPATIENT)
Dept: PHYSICAL THERAPY | Facility: CLINIC | Age: 33
End: 2023-02-27

## 2023-02-27 ENCOUNTER — TELEPHONE (OUTPATIENT)
Dept: FAMILY MEDICINE CLINIC | Facility: CLINIC | Age: 33
End: 2023-02-27

## 2023-02-27 NOTE — TELEPHONE ENCOUNTER
I don't think this is important for rheumatologist visit   Lab tests, however are important for him to complete   Should follow through with rheum visit in addition to PT

## 2023-02-27 NOTE — TELEPHONE ENCOUNTER
Please call patient  Let him know that I received call from [address] [place] [address]  They felt that since I did not palpate a lump in the armpit area, the test would really not show much  If we are looking for pulled muscle or some other issue, ultrasound is not best test  I am going to cancel this test  I see that he started PT and will have him keep f/u appt with me  If still with his pain despite PT, can consider MRI of cervical/thoracic spine to rule out nerve root impingement

## 2023-02-27 NOTE — TELEPHONE ENCOUNTER
patient aware but wants to know if this test is the test that Dr Maris Flowers wanted for the rheumatologist to have ---Patient states he has his initial PT on 02/23/2023 and another appt today-pt states PT wanted him to give us a call because she believes she can only provide stretches to help but the pain is from a autoimmune disorder     Pt has appt set up rheumatologist but wants to know if he should continue

## 2023-02-28 ENCOUNTER — HOSPITAL ENCOUNTER (OUTPATIENT)
Dept: ULTRASOUND IMAGING | Facility: HOSPITAL | Age: 33
Discharge: HOME | End: 2023-02-28

## 2023-02-28 ENCOUNTER — CONSULT (OUTPATIENT)
Dept: CARDIOLOGY CLINIC | Facility: CLINIC | Age: 33
End: 2023-02-28

## 2023-02-28 ENCOUNTER — HOSPITAL ENCOUNTER (OUTPATIENT)
Dept: NON INVASIVE DIAGNOSTICS | Age: 33
Discharge: HOME | End: 2023-02-28

## 2023-02-28 VITALS
WEIGHT: 206 LBS | HEART RATE: 58 BPM | HEIGHT: 70 IN | BODY MASS INDEX: 29.49 KG/M2 | SYSTOLIC BLOOD PRESSURE: 116 MMHG | DIASTOLIC BLOOD PRESSURE: 78 MMHG

## 2023-02-28 VITALS
DIASTOLIC BLOOD PRESSURE: 90 MMHG | SYSTOLIC BLOOD PRESSURE: 118 MMHG | HEIGHT: 70 IN | HEART RATE: 84 BPM | WEIGHT: 208 LBS | BODY MASS INDEX: 29.78 KG/M2

## 2023-02-28 DIAGNOSIS — R06.09 DOE (DYSPNEA ON EXERTION): ICD-10-CM

## 2023-02-28 DIAGNOSIS — R07.9 CHEST PAIN, UNSPECIFIED TYPE: ICD-10-CM

## 2023-02-28 DIAGNOSIS — R00.2 PALPITATIONS: ICD-10-CM

## 2023-02-28 LAB
AORTIC ROOT: 3.5 CM
APICAL FOUR CHAMBER EJECTION FRACTION: 70 %
ASCENDING AORTA: 3.8 CM
DOP CALC LVOT DIAMETER: 2.2 CM
E WAVE DECELERATION TIME: 316 MS
E/A RATIO: 0.8
FRACTIONAL SHORTENING: 36 % (ref 28–44)
INTERVENTRICULAR SEPTUM IN DIASTOLE (PARASTERNAL SHORT AXIS VIEW): 1.2 CM
INTERVENTRICULAR SEPTUM: 1.2 CM (ref 0.6–1.1)
LEFT ATRIUM AREA SYSTOLE SINGLE PLANE A4C: 12.9 CM2
LEFT ATRIUM SIZE: 3.3 CM
LEFT INTERNAL DIMENSION IN SYSTOLE: 2.9 CM (ref 2.1–4)
LEFT VENTRICULAR INTERNAL DIMENSION IN DIASTOLE: 4.5 CM (ref 3.5–6)
LEFT VENTRICULAR POSTERIOR WALL IN END DIASTOLE: 1 CM
LEFT VENTRICULAR STROKE VOLUME: 51 ML
LVSV (TEICH): 51 ML
MV E'TISSUE VEL-SEP: 10 CM/S
MV PEAK A VEL: 0.54 M/S
MV PEAK E VEL: 43 CM/S
MV STENOSIS PRESSURE HALF TIME: 92 MS
MV VALVE AREA P 1/2 METHOD: 2.4
RA PRESSURE ESTIMATED: 3 MMHG
RIGHT ATRIUM AREA SYSTOLE A4C: 14.3 CM2
RIGHT VENTRICLE ID DIMENSION: 4.1 CM
RV PSP: 21 MMHG
SL CV LV EF: 55
SL CV PED ECHO LEFT VENTRICLE DIASTOLIC VOLUME (MOD BIPLANE) 2D: 83 ML
SL CV PED ECHO LEFT VENTRICLE SYSTOLIC VOLUME (MOD BIPLANE) 2D: 32 ML
TR MAX PG: 18 MMHG
TR PEAK VELOCITY: 2.1 M/S
TRICUSPID ANNULAR PLANE SYSTOLIC EXCURSION: 1.6 CM
TRICUSPID VALVE PEAK REGURGITATION VELOCITY: 2.1 M/S

## 2023-02-28 RX ORDER — IBUPROFEN 200 MG
400 TABLET ORAL DAILY PRN
COMMUNITY

## 2023-02-28 NOTE — PROGRESS NOTES
[facility]    Name:Agustin BERNSTEIN Host   DOS: 2/28/2023     Chief Complaint:   Chief Complaint   Patient presents with   • Chest Pain     Consult - PCP for L chest discomfort at rest  Improved during steroid course  HISTORY OF PRESENT ILLNESS:      HPI:  Willy Delgado is a 27 y o  male  He  has a past medical history of Anxiety disorder (10/17/2012), Chest pain, COVID-19 (01/06/2022), Depression (10/17/2012), Dizziness (10/18/2022), History of hepatitis C, Kidney stone, Substance abuse ([address] ), and Umbilical pain  He presents for evaluation of the chest the patient reports initial onset of symptoms back in October 2022, recalling that he woke up from sleep to let the dog out and when he went back to lay down he had sudden onset severe pain on the left side of his chest and left shoulder and left arm  He states that this pain caused him significant discomfort, for which he sought evaluation and was seen in the emergency department at [facility]   Although the records of that entire encounter are not available to me at this time, the patient does report that cardiac enzyme levels and an EKG were checked and found to be within normal limits  Since then, he has undergone an exercise treadmill stress ECG which was negative for ischemia  Due to recurrence of the pain with increasing severity, he was evaluated in [facility] and started on a Medrol Dosepak 2 weeks ago  Since the short steroid taper, the patient states that he has had a substantial improvement in pain but there is still some residual pain  As a separate issue, the patient has been experiencing new localized spotty alopecia on his eyebrows and beard  He denies exertional chest pain, shortness of breath, diaphoresis, dizziness, palpitations, orthopnea, edema, syncope, claudication with exertion  He is able to exert himself with out angina or limiting dyspnea    He reports that the pain actually improves with movement and exertion  There is no known family history of heart disease  He does have a strong family history of autoimmune disease  ROS    ROS: Pertinent positives and negatives as described in History of Present Illness  Remainder of a 14 point review of systems was negative  No Known Allergies     Current Outpatient Medications on File Prior to Visit   Medication Sig Dispense Refill   • busPIRone (BUSPAR) 5 mg tablet TAKE 1 TABLET BY MOUTH TWICE A  tablet 2   • fluticasone (FLONASE) 50 mcg/act nasal spray 1 spray into each nostril as needed       • hydrOXYzine HCL (ATARAX) 25 mg tablet TAKE 1-2 TABLETS (25-50 MG TOTAL) BY MOUTH DAILY AT BEDTIME 180 tablet 1   • ibuprofen (MOTRIN) 200 mg tablet Take 400 mg by mouth daily as needed for mild pain     • methocarbamol (ROBAXIN) 500 mg tablet Take 1 tablet (500 mg total) by mouth 3 (three) times a day (Patient not taking: Reported on 2/28/2023) 60 tablet 1     No current facility-administered medications on file prior to visit  Past Medical History:   Diagnosis Date   • Anxiety disorder 10/17/2012   • Chest pain     seen in ED at [facility] 10/[facility]; CTA head and neck negative  troponins negative  EKG normal  nuclear stress test 11/2022 negative   • COVID-19 01/06/2022   • Depression 10/17/2012    situational  around mother's death   • Dizziness 10/18/2022    ED visit to [place] for chest pain, diaphoresis and dizziness  CTA head neck negative  CXR nl  ekg normal  d-dimer -; troponins -   • History of hepatitis C    • Kidney stone    • Substance abuse ([facility] [address] )     clean since 2015; pain killers and heroin   • Umbilical pain        Past Surgical History:   Procedure Laterality Date   • CYSTOSCOPY W/ URETERAL STENT PLACEMENT  2012    in [facility]   • CYSTOSCOPY W/ URETEROSCOPY W/ LITHOTRIPSY Left 08/18/2022    dr lynch   • EGD  11/17/2022    normal esophagus  erythematous mucosa in the antrum   normal mucosa in fundus   • FL RETROGRADE PYELOGRAM 2022   • HERNIA REPAIR     • OR CYSTO/URETERO W/LITHOTRIPSY &INDWELL STENT INSRT Left 2022    Procedure: CYSTOSCOPY URETEROSCOPY WITH LITHOTRIPSY HOLMIUM LASER, RETROGRADE PYELOGRAM AND INSERTION STENT URETERAL;  Surgeon: Maurisio Chadwick MD;  [facility];  Service: Urology   • OR RPR UMBILICAL HRNA 5 YRS/> REDUCIBLE N/A 2022    Procedure: REPAIR HERNIA UMBILICAL OPEN;  Surgeon: Shyam Merrill MD;  Location: [facility] MAIN OR;  Service: General   • WRIST FRACTURE SURGERY Right        Family History   Problem Relation Age of Onset   • Leukemia Mother    • Depression Father    • Lupus Maternal Aunt    • Lupus Cousin    • Substance Abuse Neg Hx    • Alcohol abuse Neg Hx        Social History     Socioeconomic History   • Marital status: /Civil Union     Spouse name: Not on file   • Number of children: Not on file   • Years of education: Not on file   • Highest education level: Not on file   Occupational History   • Not on file   Tobacco Use   • Smoking status: Former     Packs/day: 1 00     Years: 10 00     Pack years: 10 00     Types: Cigarettes     Quit date:      Years since quittin 1   • Smokeless tobacco: Never   Vaping Use   • Vaping Use: Never used   Substance and Sexual Activity   • Alcohol use: Not Currently   • Drug use: Not Currently     Types: Heroin, Oxycodone     Comment: clean since ; pain killers and heroin   • Sexual activity: Yes     Partners: Female   Other Topics Concern   • Not on file   Social History Narrative    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     Social Determinants of Health     Financial Resource Strain: Not on file   Food Insecurity: Not on file   Transportation Needs: Not on file   Physical Activity: Not on file   Stress: Not on file   Social Connections: Not on file   Intimate Partner Violence: Not on file   Housing Stability: Not on file       OBJECTIVE:    /90 (BP Location: Left arm, Patient Position: Sitting, Cuff Size: Standard) Pulse 84   Ht 5' 10" (1 778 m)   Wt 94 3 kg (208 lb)   BMI 29 84 kg/m²      BP Readings from Last 3 Encounters:   02/28/23 118/90   02/14/23 108/74   11/17/22 105/74       Wt Readings from Last 3 Encounters:   02/28/23 94 3 kg (208 lb)   02/14/23 93 8 kg (206 lb 12 8 oz)   11/17/22 92 1 kg (203 lb)         Physical Exam  Vitals reviewed  Constitutional:       General: He is not in acute distress  Appearance: Normal appearance  He is not diaphoretic  HENT:      Head: Normocephalic and atraumatic  Eyes:      Conjunctiva/sclera: Conjunctivae normal    Neck:      Vascular: No carotid bruit or JVD  Cardiovascular:      Rate and Rhythm: Normal rate and regular rhythm  Pulses: Normal pulses  Heart sounds: Normal heart sounds  No murmur heard  No friction rub  No gallop  Pulmonary:      Effort: Pulmonary effort is normal       Breath sounds: Normal breath sounds  No wheezing, rhonchi or rales  Chest:      Chest wall: Tenderness present  No mass, swelling or crepitus  Comments: Areas of tenderness demarcated  Abdominal:      General: Abdomen is flat  Bowel sounds are normal  There is no distension  Palpations: Abdomen is soft  Musculoskeletal:      Right lower leg: No edema  Left lower leg: No edema  Skin:     General: Skin is warm and dry  Neurological:      General: No focal deficit present  Mental Status: He is alert and oriented to person, place, and time  Psychiatric:         Mood and Affect: Mood normal          Behavior: Behavior normal                                                        Cardiac testing:   EKG reviewed personally as scanned in the chart performed 10/18/2022  My read: Normal sinus rhythm  Normal axis, normal intervals          LABS:  Lab Results   Component Value Date    GLUCOSE 74 01/12/2015    BUN 16 08/19/2022    CREATININE 1 05 08/19/2022    CALCIUM 8 5 08/19/2022     01/12/2015    K 3 7 08/19/2022    CO2 27 08/19/2022  08/19/2022    ALKPHOS 39 (L) 01/12/2015    BILITOT 1 0 01/12/2015    PROT 7 4 01/12/2015    AST 20 05/04/2022    ALT 36 05/04/2022    ANIONGAP 4 01/12/2015        Lab Results   Component Value Date    WBC 14 13 (H) 08/19/2022    HGB 15 6 08/19/2022    HCT 46 2 08/19/2022    MCV 92 08/19/2022     08/19/2022       Lab Results   Component Value Date    HDL 43 03/29/2021    LDLCALC 109 (H) 03/29/2021    TRIG 131 03/29/2021       Lab Results   Component Value Date    HGBA1C 5 5 05/04/2022       Lab Results   Component Value Date    TSH 1 530 03/29/2021           ASSESSMENT/PLAN:  Diagnoses and all orders for this visit:    Chest pain, unspecified type  Reproducible left-sided chest discomfort with no overt evidence of ischemia by exercise stress testing or labs at the time of initial onset  The pain is reproducible on exam today  In this setting, I agree with Dr Susana Galindo assessment that this is likely non-cardiac pain  My suspicion is highest for a musculoskeletal etiology, for which the differential includes myositis, costochondritis, and rheumatologic etiologies  I have ordered an ESR and CK level to be added onto labs already ordered by his PCP  He has a transthoracic echo already scheduled for this morning, to be done immediately after this office visit  Patient states that he is scheduled to see a rheumatologist tomorrow, which I think will be a vital next step in evaluation of his symptoms  He will likely benefit from a short course of prescription dose NSAID therapy vs steroids with GI prophylaxis, pending rheumatology workup  -     Ambulatory Referral to Cardiology  -     Sedimentation rate, automated  -     CK (with reflex to MB)    ALBERTS (dyspnea on exertion)  Not an active issue per the patient at this time  -     Ambulatory Referral to Cardiology    Palpitations  No palpitations at rest or with exertion per the patient at this time    -     Ambulatory Referral to Cardiology    Other orders  -     ibuprofen (MOTRIN) 200 mg tablet;  Take 400 mg by mouth daily as needed for mild pain                Salas Bliss MD

## 2023-03-01 ENCOUNTER — TELEPHONE (OUTPATIENT)
Dept: FAMILY MEDICINE CLINIC | Facility: CLINIC | Age: 33
End: 2023-03-01

## 2023-03-01 PROBLEM — R93.1 ABNORMAL ECHOCARDIOGRAM: Status: ACTIVE | Noted: 2023-03-01

## 2023-03-01 PROBLEM — I77.810 ASCENDING AORTA DILATATION (HCC): Status: ACTIVE | Noted: 2023-03-01

## 2023-03-01 LAB
ANA TITR SER IF: NEGATIVE {TITER}
CK SERPL-CCNC: 107 U/L (ref 49–439)
CRP SERPL-MCNC: <1 MG/L (ref 0–10)
ERYTHROCYTE [SEDIMENTATION RATE] IN BLOOD BY WESTERGREN METHOD: 2 MM/HR (ref 0–15)

## 2023-03-01 NOTE — TELEPHONE ENCOUNTER
----- Message from Carol Oswald DO sent at 3/1/2023  3:12 PM EST -----  Let patient know that I heard back from cardiology  For the thickening of the septum, he is recommending repeat of echo in 1 year just to be sure it does not enlarge over time  He thinks this is mild and should not likely to cause any issues at this time  For the dilated aortic root, he recommends a CT scan of the chest with contrast in 1 year  I will go ahead and order both for 1 year

## 2023-03-15 ENCOUNTER — HOSPITAL ENCOUNTER (OUTPATIENT)
Dept: CT IMAGING | Facility: HOSPITAL | Age: 33
Discharge: HOME | End: 2023-03-15

## 2023-03-15 DIAGNOSIS — I77.810 ASCENDING AORTA DILATATION (HCC): ICD-10-CM

## 2023-03-15 RX ADMIN — IOHEXOL 100 ML: 350 INJECTION, SOLUTION INTRAVENOUS at 17:54

## 2023-03-21 DIAGNOSIS — I77.810 ASCENDING AORTA DILATATION (HCC): Primary | ICD-10-CM

## 2023-03-30 ENCOUNTER — OFFICE VISIT (OUTPATIENT)
Dept: CARDIOLOGY CLINIC | Facility: CLINIC | Age: 33
End: 2023-03-30

## 2023-03-30 VITALS
HEIGHT: 70 IN | HEART RATE: 75 BPM | SYSTOLIC BLOOD PRESSURE: 120 MMHG | BODY MASS INDEX: 29.2 KG/M2 | DIASTOLIC BLOOD PRESSURE: 88 MMHG | WEIGHT: 204 LBS

## 2023-03-30 DIAGNOSIS — R07.9 CHEST PAIN, UNSPECIFIED TYPE: Primary | ICD-10-CM

## 2023-03-30 NOTE — PROGRESS NOTES
[facility]    Name:Agustin BERNSTEIN Host   DOS: 3/30/2023     Chief Complaint:   Chief Complaint   Patient presents with   • Follow-up     1 month - slight improvement in symptoms but continuing       HISTORY OF PRESENT ILLNESS:      HPI:  Simona Vogel is a 27 y o  male  He  has a past medical history of Anxiety disorder (10/17/2012), Chest pain, COVID-19 (01/06/2022), Depression (10/17/2012), Dizziness (10/18/2022), History of hepatitis C, Kidney stone, Substance abuse ([facility] [address] ), and Umbilical pain  He presents for follow-up evaluation  Last saw me in the office on 2/20/2023  Per my office visit note at that time:  He presents for evaluation of the chest the patient reports initial onset of symptoms back in October 2022, recalling that he woke up from sleep to let the dog out and when he went back to lay down he had sudden onset severe pain on the left side of his chest and left shoulder and left arm  He states that this pain caused him significant discomfort, for which he sought evaluation and was seen in the emergency department at [facility]   Although the records of that entire encounter are not available to me at this time, the patient does report that cardiac enzyme levels and an EKG were checked and found to be within normal limits  Since then, he has undergone an exercise treadmill stress ECG which was negative for ischemia  Due to recurrence of the pain with increasing severity, he was evaluated in [facility] and started on a Medrol Dosepak 2 weeks ago  Since the short steroid taper, the patient states that he has had a substantial improvement in pain but there is still some residual pain  Based upon his presenting complaints, he was referred for a transthoracic echocardiogram  The study demonstrated mildly increased septal wall thickness with a mild increase in ascending aorta size at 3 8cm, but was otherwise within normal limits       Today, the patient reports persistent symptoms although of less frequency and severity  He recently had a gastrointestinal illness, and since that time he states that there has been a noticeable improvement in his symptoms  He continues to work with his PCP regarding evaluation and management of his symptoms  ROS    ROS: Pertinent positives and negatives as described in History of Present Illness  Remainder of a 14 point review of systems was negative  No Known Allergies     Current Outpatient Medications on File Prior to Visit   Medication Sig Dispense Refill   • fluticasone (FLONASE) 50 mcg/act nasal spray 1 spray into each nostril as needed       • hydrOXYzine HCL (ATARAX) 25 mg tablet TAKE 1-2 TABLETS (25-50 MG TOTAL) BY MOUTH DAILY AT BEDTIME 180 tablet 1   • ibuprofen (MOTRIN) 200 mg tablet Take 400 mg by mouth daily as needed for mild pain     • [DISCONTINUED] busPIRone (BUSPAR) 5 mg tablet TAKE 1 TABLET BY MOUTH TWICE A  tablet 2   • [DISCONTINUED] methocarbamol (ROBAXIN) 500 mg tablet Take 1 tablet (500 mg total) by mouth 3 (three) times a day (Patient not taking: Reported on 2/28/2023) 60 tablet 1     No current facility-administered medications on file prior to visit  Past Medical History:   Diagnosis Date   • Anxiety disorder 10/17/2012   • Chest pain     seen in ED at [facility] 10/2022; CTA head and neck negative  troponins negative  EKG normal  nuclear stress test 11/2022 negative   • COVID-19 01/06/2022   • Depression 10/17/2012    situational  around mother's death   • Dizziness 10/18/2022    ED visit to [place] for chest pain, diaphoresis and dizziness  CTA head neck negative   CXR nl  ekg normal  d-dimer -; troponins -   • History of hepatitis C    • Kidney stone    • Substance abuse ([facility] [address] )     clean since 2015; pain killers and heroin   • Umbilical pain        Past Surgical History:   Procedure Laterality Date   • CYSTOSCOPY W/ URETERAL STENT PLACEMENT  2012    in [address] [place] [address] W/ URETEROSCOPY W/ LITHOTRIPSY Left 2022    dr lynch   • EGD  2022    normal esophagus  erythematous mucosa in the antrum   normal mucosa in fundus   • FL RETROGRADE PYELOGRAM  2022   • HERNIA REPAIR     • VA CYSTO/URETERO W/LITHOTRIPSY &INDWELL STENT INSRT Left 2022    Procedure: CYSTOSCOPY URETEROSCOPY WITH LITHOTRIPSY HOLMIUM LASER, RETROGRADE PYELOGRAM AND INSERTION STENT URETERAL;  Surgeon: Veta Sandifer, MD;  [facility];  Service: Urology   • VA RPR UMBILICAL HRNA 5 YRS/> REDUCIBLE N/A 2022    Procedure: REPAIR HERNIA UMBILICAL OPEN;  Surgeon: Ana Bowman MD;  Location: [facility] MAIN [facility];  Service: General   • WRIST FRACTURE SURGERY Right        Family History   Problem Relation Age of Onset   • Leukemia Mother    • Depression Father    • Lupus Maternal Aunt    • Lupus Cousin    • Substance Abuse Neg Hx    • Alcohol abuse Neg Hx        Social History     Socioeconomic History   • Marital status: /Civil Union     Spouse name: Not on file   • Number of children: Not on file   • Years of education: Not on file   • Highest education level: Not on file   Occupational History   • Not on file   Tobacco Use   • Smoking status: Former     Packs/day: 1 00     Years: 10 00     Pack years: 10 00     Types: Cigarettes     Quit date: 2016     Years since quittin 2   • Smokeless tobacco: Never   Vaping Use   • Vaping Use: Never used   Substance and Sexual Activity   • Alcohol use: Not Currently   • Drug use: Not Currently     Types: Heroin, Oxycodone     Comment: clean since ; pain killers and heroin   • Sexual activity: Yes     Partners: Female   Other Topics Concern   • Not on file   Social History Narrative    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     Social Determinants of Health     Financial Resource Strain: Not on file   Food Insecurity: Not on file   Transportation Needs: Not on file   Physical Activity: Not on file   Stress: Not on file   Social Connections: "Not on file   Intimate Partner Violence: Not on file   Housing Stability: Not on file       OBJECTIVE:    /88 (BP Location: Left arm, Patient Position: Sitting, Cuff Size: Standard)   Pulse 75   Ht 5' 10\" (1 778 m)   Wt 92 5 kg (204 lb)   BMI 29 27 kg/m²      BP Readings from Last 3 Encounters:   03/30/23 120/88   02/28/23 116/78   02/28/23 118/90       Wt Readings from Last 3 Encounters:   03/30/23 92 5 kg (204 lb)   02/28/23 93 4 kg (206 lb)   02/28/23 94 3 kg (208 lb)       Limited exam due to result review appointment  Physical Exam  Constitutional:       General: He is not in acute distress  Appearance: Normal appearance  He is not diaphoretic  HENT:      Head: Normocephalic and atraumatic  Eyes:      Conjunctiva/sclera: Conjunctivae normal    Pulmonary:      Effort: Pulmonary effort is normal  No respiratory distress  Neurological:      General: No focal deficit present  Mental Status: He is alert and oriented to person, place, and time     Psychiatric:         Mood and Affect: Mood normal          Behavior: Behavior normal                                                        LABS:  Lab Results   Component Value Date    GLUCOSE 74 01/12/2015    BUN 16 08/19/2022    CREATININE 1 05 08/19/2022    CALCIUM 8 5 08/19/2022     01/12/2015    K 3 7 08/19/2022    CO2 27 08/19/2022     08/19/2022    ALKPHOS 39 (L) 01/12/2015    BILITOT 1 0 01/12/2015    PROT 7 4 01/12/2015    AST 20 05/04/2022    ALT 36 05/04/2022    ANIONGAP 4 01/12/2015        Lab Results   Component Value Date    WBC 14 13 (H) 08/19/2022    HGB 15 6 08/19/2022    HCT 46 2 08/19/2022    MCV 92 08/19/2022     08/19/2022       Lab Results   Component Value Date    HDL 43 03/29/2021    LDLCALC 109 (H) 03/29/2021    TRIG 131 03/29/2021       Lab Results   Component Value Date    HGBA1C 5 5 05/04/2022       Lab Results   Component Value Date    TSH 1 530 03/29/2021         ASSESSMENT/PLAN:  Diagnoses and all " orders for this visit:    Chest pain, unspecified type  Reproducible left-sided chest discomfort with no overt evidence of ischemia by exercise stress testing or labs at the time of initial onset  His ECHO demonstrates no clinically significant pathology to explain his symptoms, and exercise stress testing has recently been within normal limits  ESR and CRP were also within normal limits  His chest pain is entirely nonexertional, and reproducible with movement  At this time, I suspect a noncardiac etiology of his pain  There may be utility in an MRI of the chest wall and/or left shoulder, however I have recommended to the patient to discuss this further with his primary care physician  As an added caution, I did advise the patient that stress tests are generally useful in detecting significant flow-limiting coronary disease, however they are not reliably able to detect non-flow limiting disease  As such, we reviewed the pathophysiology of arterial plaques and spontaneous plaque rupture that can result in a myocardial infarction  The patient is aware that a negative stress test does not exclude the possibility of them having an MI  If there is a change in symptom frequency/severity/character, I did explain to the patient that there may eventually be a role for CT angiography of the coronary arteries  As he has no coronary calcium on the recent CTA chest done, my clinical suspicion for flow-limiting coronary disease based on that as well as his symptoms is low  He may follow-up with me as needed                  Victoriano Wakefield MD

## 2023-04-04 ENCOUNTER — OFFICE VISIT (OUTPATIENT)
Dept: FAMILY MEDICINE CLINIC | Facility: CLINIC | Age: 33
End: 2023-04-04

## 2023-04-04 VITALS
DIASTOLIC BLOOD PRESSURE: 84 MMHG | BODY MASS INDEX: 28.28 KG/M2 | SYSTOLIC BLOOD PRESSURE: 124 MMHG | WEIGHT: 202 LBS | OXYGEN SATURATION: 99 % | HEART RATE: 83 BPM | HEIGHT: 71 IN | TEMPERATURE: 97.3 F

## 2023-04-04 DIAGNOSIS — R73.01 IMPAIRED FASTING GLUCOSE: ICD-10-CM

## 2023-04-04 DIAGNOSIS — R93.1 ABNORMAL ECHOCARDIOGRAM: ICD-10-CM

## 2023-04-04 DIAGNOSIS — Z00.00 ENCOUNTER FOR ANNUAL PHYSICAL EXAM: Primary | ICD-10-CM

## 2023-04-04 DIAGNOSIS — I77.810 ASCENDING AORTA DILATATION (HCC): ICD-10-CM

## 2023-04-04 DIAGNOSIS — K21.9 GASTROESOPHAGEAL REFLUX DISEASE, UNSPECIFIED WHETHER ESOPHAGITIS PRESENT: ICD-10-CM

## 2023-04-04 DIAGNOSIS — Z13.6 SCREENING FOR CARDIOVASCULAR CONDITION: ICD-10-CM

## 2023-04-04 DIAGNOSIS — Z13.0 SCREENING FOR DEFICIENCY ANEMIA: ICD-10-CM

## 2023-04-04 DIAGNOSIS — R07.89 CHEST WALL PAIN: ICD-10-CM

## 2023-04-04 PROBLEM — R07.9 CHEST PAIN: Status: RESOLVED | Noted: 2022-11-04 | Resolved: 2023-04-04

## 2023-04-04 NOTE — PROGRESS NOTES
[facility] PRIMARY CARE    NAME: Kulwant Reynoso Host  AGE: 27 y o  SEX: male  : 1990     DATE: 2023     Assessment and Plan:     Problem List Items Addressed This Visit        Digestive    RESOLVED: Gastroesophageal reflux disease       Endocrine    Impaired fasting glucose    Relevant Orders    Comprehensive metabolic panel    HEMOGLOBIN A1C W/ EAG ESTIMATION  Lab Results   Component Value Date     2015    SODIUM 142 2022    K 3 7 2022     2022    CO2 27 2022    ANIONGAP 4 2015    AGAP 10 2022    BUN 16 2022    CREATININE 1 05 2022    GLUC 89 2022    CALCIUM 8 5 2022    AST 20 2022    ALT 36 2022    ALKPHOS 39 (L) 2015    PROT 7 4 2015    TP 7 0 2022    BILITOT 1 0 2015    TBILI 0 7 2022    EGFR 92 2022     Lab Results   Component Value Date    HGBA1C 5 5 2022     Check labs  Discussed diet/exercisde       Cardiovascular and Mediastinum    Ascending aorta dilatation (HCC)  Reviewed results of his echocardiogram    Mild dilatation of aortic root (3 8 cm) for which CT of chest is recommended in 1 year       Other    Abnormal echocardiogram  Reviewed echo done 23  Showed mild asymmetric hypertrophy of the septal wall  Patient to have repeat echo in 1 year   Other Visit Diagnoses     Encounter for annual physical exam    -  Primary  Discussed diet and exercise  Screening labs ordered  Immunization History   Administered Date(s) Administered   • COVID-19 PFIZER VACCINE 0 3 ML IM 2021, 2021   • Hep A, adult 2022   • Hep B, adult 2022, 2022, 2022   • INFLUENZA 2014   • Tdap 2018   • Tuberculin Skin Test-PPD Intradermal 2011     Recommended his second hep A  He declines this today         BMI 28 0-28 9,adult        Chest wall pain        Relevant Orders    Ambulatory Referral to Pain Management  Extensive workup for his chest pain including EST, echocardiogram, CT chest, labs, cardiology consultation, rheumatology consultation and GI consult with EGD  Still with pain which is reproducible on palpation of anterior chest wall  He tried NSAIDs with limited relief  Refer pain management for ? Injections  Patient to call if any change in his sx  Screening for cardiovascular condition        Relevant Orders    Lipid panel    Screening for deficiency anemia        Relevant Orders    CBC and differential          Immunizations and preventive care screenings were discussed with patient today  Appropriate education was printed on patient's after visit summary  Discussed risks and benefits of prostate cancer screening  We discussed the controversial history of PSA screening for prostate cancer in the [place] as well as the risk of over detection and over treatment of prostate cancer by way of PSA screening  The patient understands that PSA blood testing is an imperfect way to screen for prostate cancer and that elevated PSA levels in the blood may also be caused by infection, inflammation, prostatic trauma or manipulation, urological procedures, or by benign prostatic enlargement  The role of the digital rectal examination in prostate cancer screening was also discussed and I discussed with him that there is large interobserver variability in the findings of digital rectal examination  Counseling:  Alcohol/drug use: discussed moderation in alcohol intake, the recommendations for healthy alcohol use, and avoidance of illicit drug use  Dental Health: discussed importance of regular tooth brushing, flossing, and dental visits  Injury prevention: discussed safety/seat belts, safety helmets, smoke detectors, carbon dioxide detectors, and smoking near bedding or upholstery    Exercise: the importance of regular exercise/physical activity was discussed  Recommend exercise 3-5 times per week for at least 30 minutes  No follow-ups on file  Chief Complaint:     Chief Complaint   Patient presents with   • Annual Exam     Pt is in the office c/o Annual Physical and Follow-up  Last OV 2/14/2023 (cervical radiculopathy)  Stated concerns - no new concerns  Neck pain consistent  Chest pains still occur - intermittent quality, near constant  Flu shot - declined  Labs completed - 2/28/2023  Lab - [facility]      History of Present Illness:     Adult Annual Physical   Patient is a 27year old male with GERD, fatty liver, impaired fasting glucose, and ascending aortic dilatation who is here for a comprehensive physical exam      Patient has had extensive workup for complaint of chest wall pain  He most recently saw cardiology in f/u after having exercise stress test, echocardiogram and CT of chest done as part of his workup  Cardiology felt that pain was not cardiac in nature  Recommended a repeat of the echo in one year for the mild asymmetric hypertrophy of the septal wall as as well as repeat CT of chest in 1 year to f/u on the mild dilatation of aortic root  He had also seen rheumatology in consultation to rule out some sort of autoimmune/rheumatologic cause of his chest pains  Has also seen GI to rule out GI causes of his pain  EGD completed 11/17/22    Still getting the pains in left chest wall most days  Pain is radiating around to axillary region  Has pain on palpation of chest wall  No aggravating or alleviating factors but seems to be worse in afternoon/evening hours  Pain is a 4-5 in severity  When he gets pain, it provokes anxiety which then causes pain to worsen and feel dizzy  Interestingly, had no pain for 4 days after a recent GI bug  He has tried anti-inflammatories which helped for short time and stopped working  His heartburn has been well controlled and no longer taking meds       REYNOLD-7 Flowsheet Screening    Flowsheet Row Most Recent Value   Over the last 2 weeks, how often have you been bothered by any of the following problems? Feeling nervous, anxious, or on edge 0   Not being able to stop or control worrying 0   Worrying too much about different things 0   Trouble relaxing 0   Being so restless that it is hard to sit still 0   Becoming easily annoyed or irritable 0   Feeling afraid as if something awful might happen 0   REYNOLD-7 Total Score 0            The patient reports problems - ongoing chest pain  Diet and Physical Activity  Diet/Nutrition: well balanced diet  Exercise: no formal exercise  Depression Screening  PHQ-2/9 Depression Screening    Little interest or pleasure in doing things: 0 - not at all  Feeling down, depressed, or hopeless: 0 - not at all  PHQ-2 Score: 0  PHQ-2 Interpretation: Negative depression screen       General Health  Sleep: sleeps well  Hearing: normal - bilateral   Vision: no vision problems  Dental: regular dental visits   Health  Symptoms include: none     Review of Systems:     Review of Systems   Past Medical History:     Past Medical History:   Diagnosis Date   • Anxiety disorder 10/17/2012   • Chest pain     seen in ED at [facility]; CTA head and neck negative  troponins negative  EKG normal  nuclear stress test 11/2022 negative   • COVID-19 01/06/2022   • Depression 10/17/2012    situational  around mother's death   • Dizziness 10/18/2022    ED visit to [place] for chest pain, diaphoresis and dizziness  CTA head neck negative   CXR nl  ekg normal  d-dimer -; troponins -   • Gastroesophageal reflux disease 11/4/2022   • History of hepatitis C    • Kidney stone    • Substance abuse ([address] )     clean since 2015; pain killers and heroin   • Umbilical pain       Past Surgical History:     Past Surgical History:   Procedure Laterality Date   • CYSTOSCOPY W/ URETERAL STENT PLACEMENT  2012    in [facility]   • CYSTOSCOPY W/ URETEROSCOPY W/ LITHOTRIPSY Left 08/18/2022     [facility]   • EGD  2022    normal esophagus  erythematous mucosa in the antrum   normal mucosa in fundus   • FL RETROGRADE PYELOGRAM  2022   • HERNIA REPAIR     • AL CYSTO/URETERO W/LITHOTRIPSY &INDWELL STENT INSRT Left 2022    Procedure: CYSTOSCOPY URETEROSCOPY WITH LITHOTRIPSY HOLMIUM LASER, RETROGRADE PYELOGRAM AND INSERTION STENT URETERAL;  Surgeon: Emily Shrestha MD;  [facility];  Service: Urology   • AL RPR UMBILICAL HRNA 5 YRS/> REDUCIBLE N/A 2022    Procedure: REPAIR HERNIA UMBILICAL OPEN;  Surgeon: Kortney Gomez MD;  Location: [facility] MAIN [facility];  Service: General   • WRIST FRACTURE SURGERY Right       Family History:     Family History   Problem Relation Age of Onset   • Leukemia Mother    • Depression Father    • Lupus Maternal Aunt    • Lupus Cousin    • Substance Abuse Neg Hx    • Alcohol abuse Neg Hx       Social History:     Social History     Socioeconomic History   • Marital status: /Civil Union     Spouse name: None   • Number of children: None   • Years of education: None   • Highest education level: None   Occupational History   • None   Tobacco Use   • Smoking status: Former     Packs/day: 1 00     Years: 10 00     Pack years: 10 00     Types: Cigarettes     Quit date: 2016     Years since quittin 2   • Smokeless tobacco: Never   Vaping Use   • Vaping Use: Never used   Substance and Sexual Activity   • Alcohol use: Not Currently   • Drug use: Not Currently     Types: Heroin, Oxycodone     Comment: clean since ; pain killers and heroin   • Sexual activity: Yes     Partners: Female   Other Topics Concern   • None   Social History Narrative    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     Social Determinants of Health     Financial Resource Strain: Not on file   Food Insecurity: Not on file   Transportation Needs: Not on file   Physical Activity: Not on file   Stress: Not on file   Social Connections: Not on file   Intimate Partner Violence: Not on file "  Housing Stability: Not on file      Current Medications:     Current Outpatient Medications   Medication Sig Dispense Refill   • fluticasone (FLONASE) 50 mcg/act nasal spray 1 spray into each nostril as needed       • hydrOXYzine HCL (ATARAX) 25 mg tablet TAKE 1-2 TABLETS (25-50 MG TOTAL) BY MOUTH DAILY AT BEDTIME 180 tablet 1   • ibuprofen (MOTRIN) 200 mg tablet Take 400 mg by mouth daily as needed for mild pain       No current facility-administered medications for this visit  Allergies:     No Known Allergies   Physical Exam:     /84 (BP Location: Left arm, Patient Position: Sitting, Cuff Size: Large)   Pulse 83   Temp (!) 97 3 °F (36 3 °C) (Tympanic)   Ht 5' 11\" (1 803 m)   Wt 91 6 kg (202 lb)   SpO2 99%   BMI 28 17 kg/m²     Physical Exam  Vitals and nursing note reviewed  Constitutional:       General: He is not in acute distress  Appearance: Normal appearance  He is not ill-appearing, toxic-appearing or diaphoretic  HENT:      Head: Normocephalic and atraumatic  Ears:      Comments: Scarring bilateral TM's     Nose: Nose normal       Mouth/Throat:      Mouth: Mucous membranes are moist       Pharynx: No oropharyngeal exudate or posterior oropharyngeal erythema  Eyes:      Extraocular Movements: Extraocular movements intact  Conjunctiva/sclera: Conjunctivae normal       Pupils: Pupils are equal, round, and reactive to light  Cardiovascular:      Rate and Rhythm: Normal rate and regular rhythm  Heart sounds: No murmur heard  No friction rub  No gallop  Pulmonary:      Effort: Pulmonary effort is normal       Breath sounds: Normal breath sounds  No wheezing, rhonchi or rales  Abdominal:      General: Abdomen is flat  Bowel sounds are normal  There is no distension  Palpations: Abdomen is soft  There is no mass  Tenderness: There is no abdominal tenderness  Musculoskeletal:         General: No deformity  Normal range of motion        Cervical back: " Normal range of motion and neck supple  Right lower leg: No edema  Left lower leg: No edema  Comments: Left shoulder with full range of motion and no tenderness on palpation  Has tenderness on palpation of anterior chest wall over left costochondral junction   Lymphadenopathy:      Cervical: No cervical adenopathy  Skin:     General: Skin is warm and dry  Findings: No rash  Neurological:      General: No focal deficit present  Mental Status: He is alert and oriented to person, place, and time        Gait: Gait normal    Psychiatric:         Mood and Affect: Mood normal           Mir Solorio DO  [facility]

## 2023-04-05 ENCOUNTER — TELEPHONE (OUTPATIENT)
Dept: FAMILY MEDICINE CLINIC | Facility: CLINIC | Age: 33
End: 2023-04-05

## 2023-04-05 NOTE — TELEPHONE ENCOUNTER
Insurance Referral -    I spoke with the patient  Pt is aware that our office has submitted and faxed the requested insurance referral to:     Facility:   [facility]# [id number]   [facility]     Dx codes:   R07 9 & R07 89    Pt advised to contact our office if any changes/ updates are requested for the referral

## 2023-04-06 ENCOUNTER — TELEPHONE (OUTPATIENT)
Dept: FAMILY MEDICINE CLINIC | Facility: CLINIC | Age: 33
End: 2023-04-06

## 2023-04-06 LAB
BASOPHILS # BLD AUTO: 0 X10E3/UL (ref 0–0.2)
BASOPHILS NFR BLD AUTO: 0 %
EOSINOPHIL # BLD AUTO: 0.2 X10E3/UL (ref 0–0.4)
EOSINOPHIL NFR BLD AUTO: 2 %
ERYTHROCYTE [DISTWIDTH] IN BLOOD BY AUTOMATED COUNT: 12.8 % (ref 11.6–15.4)
HCT VFR BLD AUTO: 44.8 % (ref 37.5–51)
HGB BLD-MCNC: 15.6 G/DL (ref 13–17.7)
IMM GRANULOCYTES # BLD: 0.1 X10E3/UL (ref 0–0.1)
IMM GRANULOCYTES NFR BLD: 1 %
LYMPHOCYTES # BLD AUTO: 2.5 X10E3/UL (ref 0.7–3.1)
LYMPHOCYTES NFR BLD AUTO: 31 %
MCH RBC QN AUTO: 29.9 PG (ref 26.6–33)
MCHC RBC AUTO-ENTMCNC: 34.8 G/DL (ref 31.5–35.7)
MCV RBC AUTO: 86 FL (ref 79–97)
MONOCYTES # BLD AUTO: 0.6 X10E3/UL (ref 0.1–0.9)
MONOCYTES NFR BLD AUTO: 7 %
NEUTROPHILS # BLD AUTO: 4.6 X10E3/UL (ref 1.4–7)
NEUTROPHILS NFR BLD AUTO: 59 %
PLATELET # BLD AUTO: 240 X10E3/UL (ref 150–450)
RBC # BLD AUTO: 5.21 X10E6/UL (ref 4.14–5.8)
WBC # BLD AUTO: 7.8 X10E3/UL (ref 3.4–10.8)

## 2023-04-06 NOTE — TELEPHONE ENCOUNTER
Records Request -    Received records request on:   4/6/2023 (last progress, EKG, B/W, cardiac testing)  Patient date of consult with cardiology: 4/6/2023  Faxed records on:   4/6/2023  Fax Confirmation received  I spoke with their staff in the Medical Records Department and confirmed that the records were received and processed

## 2023-04-07 LAB
ALBUMIN SERPL-MCNC: 4.7 G/DL (ref 4–5)
ALBUMIN/GLOB SERPL: 2 {RATIO} (ref 1.2–2.2)
ALP SERPL-CCNC: 43 IU/L (ref 44–121)
ALT SERPL-CCNC: 26 IU/L (ref 0–44)
AST SERPL-CCNC: 15 IU/L (ref 0–40)
BILIRUB SERPL-MCNC: 0.8 MG/DL (ref 0–1.2)
BUN SERPL-MCNC: 13 MG/DL (ref 6–20)
BUN/CREAT SERPL: 15 (ref 9–20)
CALCIUM SERPL-MCNC: 9.5 MG/DL (ref 8.7–10.2)
CHLORIDE SERPL-SCNC: 105 MMOL/L (ref 96–106)
CHOLEST SERPL-MCNC: 197 MG/DL (ref 100–199)
CHOLEST/HDLC SERPL: 5.2 RATIO (ref 0–5)
CO2 SERPL-SCNC: 23 MMOL/L (ref 20–29)
CREAT SERPL-MCNC: 0.86 MG/DL (ref 0.76–1.27)
EGFR: 117 ML/MIN/1.73
EST. AVERAGE GLUCOSE BLD GHB EST-MCNC: 105 MG/DL
GLOBULIN SER-MCNC: 2.3 G/DL (ref 1.5–4.5)
GLUCOSE SERPL-MCNC: 87 MG/DL (ref 70–99)
HBA1C MFR BLD: 5.3 % (ref 4.8–5.6)
HDLC SERPL-MCNC: 38 MG/DL
LDLC SERPL CALC-MCNC: 130 MG/DL (ref 0–99)
POTASSIUM SERPL-SCNC: 4.5 MMOL/L (ref 3.5–5.2)
PROT SERPL-MCNC: 7 G/DL (ref 6–8.5)
SL AMB VLDL CHOLESTEROL CALC: 29 MG/DL (ref 5–40)
SODIUM SERPL-SCNC: 141 MMOL/L (ref 134–144)
TRIGL SERPL-MCNC: 159 MG/DL (ref 0–149)

## 2023-04-27 ENCOUNTER — TELEPHONE (OUTPATIENT)
Dept: FAMILY MEDICINE CLINIC | Facility: CLINIC | Age: 33
End: 2023-04-27

## 2023-04-27 NOTE — TELEPHONE ENCOUNTER
Insurance Referral -    Referral faxed to [facility] and input in portal   Sent for scanning into patient's chart   Backdated to 4/6/2023 per request      Facility:  [facility]    NPI#: [id number]    Dx: [id number]

## 2023-06-03 DIAGNOSIS — F41.9 ANXIETY: ICD-10-CM

## 2023-06-03 RX ORDER — HYDROXYZINE HYDROCHLORIDE 25 MG/1
25-50 TABLET, FILM COATED ORAL
Qty: 180 TABLET | Refills: 1 | Status: SHIPPED | OUTPATIENT
Start: 2023-06-03

## 2023-10-12 NOTE — PATIENT INSTRUCTIONS

## 2024-03-25 ENCOUNTER — HOSPITAL ENCOUNTER (OUTPATIENT)
Dept: NON INVASIVE DIAGNOSTICS | Facility: HOSPITAL | Age: 34
Discharge: HOME | End: 2024-03-25
Payer: COMMERCIAL

## 2024-03-25 ENCOUNTER — HOSPITAL ENCOUNTER (OUTPATIENT)
Dept: CT IMAGING | Facility: HOSPITAL | Age: 34
Discharge: HOME | End: 2024-03-25
Payer: COMMERCIAL

## 2024-03-25 VITALS
BODY MASS INDEX: 28.28 KG/M2 | DIASTOLIC BLOOD PRESSURE: 84 MMHG | WEIGHT: 202 LBS | HEART RATE: 80 BPM | HEIGHT: 71 IN | SYSTOLIC BLOOD PRESSURE: 124 MMHG

## 2024-03-25 DIAGNOSIS — I77.810 ASCENDING AORTA DILATATION (HCC): ICD-10-CM

## 2024-03-25 DIAGNOSIS — R93.1 ABNORMAL ECHOCARDIOGRAM: ICD-10-CM

## 2024-03-25 PROCEDURE — 93306 TTE W/DOPPLER COMPLETE: CPT

## 2024-03-25 PROCEDURE — G1004 CDSM NDSC: HCPCS

## 2024-03-25 PROCEDURE — 71275 CT ANGIOGRAPHY CHEST: CPT

## 2024-03-25 PROCEDURE — 93306 TTE W/DOPPLER COMPLETE: CPT | Performed by: INTERNAL MEDICINE

## 2024-03-25 RX ADMIN — IOHEXOL 80 ML: 350 INJECTION, SOLUTION INTRAVENOUS at 16:28

## 2024-03-26 ENCOUNTER — TELEPHONE (OUTPATIENT)
Dept: FAMILY MEDICINE CLINIC | Facility: CLINIC | Age: 34
End: 2024-03-26

## 2024-03-26 LAB
AORTIC ROOT: 3.8 CM
AORTIC VALVE MEAN VELOCITY: 8.5 M/S
APICAL FOUR CHAMBER EJECTION FRACTION: 61 %
ASCENDING AORTA: 3.8 CM
AV LVOT MEAN GRADIENT: 2 MMHG
AV LVOT PEAK GRADIENT: 4 MMHG
AV MEAN GRADIENT: 3 MMHG
AV PEAK GRADIENT: 7 MMHG
AV VELOCITY RATIO: 0.81
BSA FOR ECHO PROCEDURE: 2.12 M2
DOP CALC AO PEAK VEL: 1.28 M/S
DOP CALC AO VTI: 22.71 CM
DOP CALC LVOT PEAK VEL VTI: 20.27 CM
DOP CALC LVOT PEAK VEL: 1.04 M/S
DOP CALC MV VTI: 14.65 CM
E WAVE DECELERATION TIME: 165 MS
E/A RATIO: 1.02
FRACTIONAL SHORTENING: 32 (ref 28–44)
INTERVENTRICULAR SEPTUM IN DIASTOLE (PARASTERNAL SHORT AXIS VIEW): 1.1 CM
INTERVENTRICULAR SEPTUM: 1.1 CM (ref 0.6–1.1)
LEFT ATRIUM SIZE: 3.6 CM
LEFT INTERNAL DIMENSION IN SYSTOLE: 3.2 CM (ref 2.1–4)
LEFT VENTRICLE DIASTOLIC VOLUME (MOD BIPLANE): 85 ML
LEFT VENTRICLE DIASTOLIC VOLUME INDEX (MOD BIPLANE): 40.1 ML/M2
LEFT VENTRICLE SYSTOLIC VOLUME (MOD BIPLANE): 36 ML
LEFT VENTRICLE SYSTOLIC VOLUME INDEX (MOD BIPLANE): 17 ML/M2
LEFT VENTRICULAR INTERNAL DIMENSION IN DIASTOLE: 4.7 CM (ref 3.5–6)
LEFT VENTRICULAR POSTERIOR WALL IN END DIASTOLE: 1.1 CM
LEFT VENTRICULAR STROKE VOLUME: 64 ML
LV EF: 57 %
LVSV (TEICH): 64 ML
MV E'TISSUE VEL-LAT: 14 CM/S
MV E'TISSUE VEL-SEP: 10 CM/S
MV MEAN GRADIENT: 1 MMHG
MV PEAK A VEL: 0.6 M/S
MV PEAK E VEL: 61 CM/S
MV PEAK GRADIENT: 2 MMHG
MV STENOSIS PRESSURE HALF TIME: 48 MS
MV VALVE AREA P 1/2 METHOD: 4.58
RA PRESSURE ESTIMATED: 3 MMHG
SINOTUBULAR JUNCTION: 3.4 CM
SL CV LV EF: 55
SL CV PED ECHO LEFT VENTRICLE DIASTOLIC VOLUME (MOD BIPLANE) 2D: 104 ML
SL CV PED ECHO LEFT VENTRICLE SYSTOLIC VOLUME (MOD BIPLANE) 2D: 40 ML
SL CV SINUS OF VALSALVA 2D: 3.6 CM
STJ: 3.4 CM
TRICUSPID ANNULAR PLANE SYSTOLIC EXCURSION: 2.3 CM

## 2024-03-26 NOTE — TELEPHONE ENCOUNTER
----- Message from Gaby Herring DO sent at 3/26/2024 12:14 PM EDT -----  Can let patient know that CT scan showed stable ascending aorta dilation. Repeat CT 1 year

## 2024-04-03 PROBLEM — Z86.19 HISTORY OF HEPATITIS C: Status: RESOLVED | Noted: 2021-06-17 | Resolved: 2024-04-03

## 2024-04-03 NOTE — PROGRESS NOTES
ADULT ANNUAL PHYSICAL  [facility] PRIMARY CARE    NAME: Agustin BERNSTEIN Host  AGE: 33 y.o. SEX: male  : 1990     DATE: 2024     Assessment and Plan:     Problem List Items Addressed This Visit          Cardiovascular and Mediastinum    Ascending aorta dilatation (HCC)    Relevant Orders    CTA chest wo w contrast  Reviewed echo and CT of chest, stable.   Repeat CT chest in 1 year. Order placed.        Endocrine    Impaired fasting glucose    Relevant Orders    Comprehensive metabolic panel    Hemoglobin A1C With EAG       Other    Transaminasemia    Relevant Orders    Comprehensive metabolic panel     Other Visit Diagnoses       Annual physical exam    -  Primary  Discussed diet and exercise  Screening labs ordered   Up to date on adacel.     Screening for cardiovascular condition        Relevant Orders    Lipid panel    Elevated blood pressure reading without diagnosis of hypertension      Follow DASH diet.   Recheck 3 months              Immunizations and preventive care screenings were discussed with patient today. Appropriate education was printed on patient's after visit summary.    Counseling:  Alcohol/drug use: discussed moderation in alcohol intake, the recommendations for healthy alcohol use, and avoidance of illicit drug use.  Injury prevention: discussed safety/seat belts, safety helmets, smoke detectors, carbon dioxide detectors, and smoking near bedding or upholstery.  Sexual health: discussed sexually transmitted diseases, partner selection, use of condoms, avoidance of unintended pregnancy, and contraceptive alternatives.  Exercise: the importance of regular exercise/physical activity was discussed. Recommend exercise 3-5 times per week for at least 30 minutes.          Return in about 3 months (around 2024) for  3 months.     Chief Complaint:     Chief Complaint   Patient presents with   • Physical Exam      History of Present Illness:      Adult Annual Physical   Patient is a 33 year old male with history of hep c, substance abuse in remission, transaminasemia, fatty liver, impaired fasting glucose and ascending aorta dilatation who is  here for a comprehensive physical exam and f/u visit.     Had echo 3/25/24 which showed:  •  Left Ventricle: Left ventricular cavity size is normal. Wall thickness is mildly increased. There is concentric remodeling. (LVMI 89 g/m2, RWT 0.47). The left ventricular ejection fraction is 55-60% by biplane measurement. Systolic function is normal. Wall motion is normal. Diastolic function is normal.  •  Right Ventricle: Right ventricular cavity size is normal. Systolic function is normal.  •  Aorta: The aortic root is normal in size. The ascending aorta is mildly dilated. The aortic root is 3.80 cm (18mm/m2). The ascending aorta is 3.8 cm (18 mm/m2).   CTA chest done 3/25/24 showed Stable ascending thoracic aortic ectasia measuring 42 mm. Recommend annual follow-up with low-dose CT.    Passed kidney stone 2 days ago. Not much pain. Has never had stone analysis done.   Had  The patient reports no problems.    Diet and Physical Activity  Diet/Nutrition: well balanced diet.   Exercise: no formal exercise.      Depression Screening  PHQ-2/9 Depression Screening    Little interest or pleasure in doing things: 0 - not at all  Feeling down, depressed, or hopeless: 0 - not at all  PHQ-2 Score: 0  PHQ-2 Interpretation: Negative depression screen       General Health  Sleep: sleeps well.   Hearing: normal - bilateral.  Vision: no vision problems.   Dental: regular dental visits.        Health  History of STDs?: no.    Advanced Care Planning  Do you have an advanced directive? no  Do you have a durable medical power of ? no  ACP document given to the patient? yes     Review of Systems:     Review of Systems   Past Medical History:     Past Medical History:   Diagnosis Date   • Anxiety disorder 10/17/2012   • Chest pain      seen in ED at [facility] 10/2022; CTA head and neck negative. troponins negative. EKG normal. nuclear stress test 2022 negative   • COVID-19 2022   • Depression 10/17/2012    situational. around mother's death   • Dizziness 10/18/2022    ED visit to [place] for chest pain, diaphoresis and dizziness. CTA head neck negative. CXR nl. ekg normal. d-dimer -; troponins -   • Fatty liver    • Gastroesophageal reflux disease 2022   • History of hepatitis C    • Kidney stone    • Substance abuse (HCC)     clean since ; pain killers and heroin   • Umbilical pain       Past Surgical History:     Past Surgical History:   Procedure Laterality Date   • CYSTOSCOPY W/ URETERAL STENT PLACEMENT      in [facility]   • CYSTOSCOPY W/ URETEROSCOPY W/ LITHOTRIPSY Left 2022    dr gutierrez   • EGD  2022    normal esophagus. erythematous mucosa in the antrum. normal mucosa in fundus   • FL RETROGRADE PYELOGRAM  2022   • HERNIA REPAIR     • [place] CYSTO/URETERO W/LITHOTRIPSY &INDWELL STENT INSRT Left 2022    Procedure: CYSTOSCOPY URETEROSCOPY WITH LITHOTRIPSY HOLMIUM LASER, RETROGRADE PYELOGRAM AND INSERTION STENT URETERAL;  Surgeon: Stu Gutierrez MD;  Location[facility];  Service: Urology   • [place] RPR UMBILICAL HRNA 5 YRS/> REDUCIBLE N/A 2022    Procedure: REPAIR HERNIA UMBILICAL OPEN;  Surgeon: Andrew Jimenez MD;  Location:  MAIN OR;  Service: General   • WRIST FRACTURE SURGERY Right       Social History:     Social History     Socioeconomic History   • Marital status: /Civil Union     Spouse name: None   • Number of children: None   • Years of education: None   • Highest education level: None   Occupational History   • None   Tobacco Use   • Smoking status: Former     Current packs/day: 0.00     Average packs/day: 1 pack/day for 10.0 years (10.0 ttl pk-yrs)     Types: Cigarettes     Start date:      Quit date: 2016     Years since quittin.2   • Smokeless tobacco: Never  "  Vaping Use   • Vaping status: Never Used   Substance and Sexual Activity   • Alcohol use: Not Currently   • Drug use: Not Currently     Types: Heroin, Oxycodone     Comment: clean since 2015; pain killers and heroin   • Sexual activity: Yes     Partners: Female   Other Topics Concern   • None   Social History Narrative    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     Social Determinants of Health     Financial Resource Strain: Not on file   Food Insecurity: Not on file   Transportation Needs: Not on file   Physical Activity: Not on file   Stress: Not on file   Social Connections: Not on file   Intimate Partner Violence: Not on file   Housing Stability: Not on file      Family History:     Family History   Problem Relation Age of Onset   • Leukemia Mother    • Depression Father    • Lupus Maternal Aunt    • Lupus Cousin    • Substance Abuse Neg Hx    • Alcohol abuse Neg Hx       Current Medications:     Current Outpatient Medications   Medication Sig Dispense Refill   • fluticasone (FLONASE) 50 mcg/act nasal spray 1 spray into each nostril as needed       • ibuprofen (MOTRIN) 200 mg tablet Take 400 mg by mouth daily as needed for mild pain       No current facility-administered medications for this visit.      Allergies:     No Known Allergies   Physical Exam:     /88   Pulse 102   Temp 98.6 °F (37 °C) (Tympanic)   Ht 5' 11\" (1.803 m)   Wt 95.6 kg (210 lb 12.8 oz)   SpO2 100%   BMI 29.40 kg/m²     Physical Exam  Vitals and nursing note reviewed.   Constitutional:       General: He is not in acute distress.     Appearance: Normal appearance. He is not ill-appearing, toxic-appearing or diaphoretic.   HENT:      Head: Normocephalic and atraumatic.      Ears:      Comments: Scarring of TM's bilaterally     Nose: Nose normal. No congestion or rhinorrhea.      Mouth/Throat:      Mouth: Mucous membranes are moist.      Pharynx: No oropharyngeal exudate or posterior oropharyngeal erythema.   Eyes:      " Extraocular Movements: Extraocular movements intact.      Conjunctiva/sclera: Conjunctivae normal.      Pupils: Pupils are equal, round, and reactive to light.   Cardiovascular:      Rate and Rhythm: Normal rate and regular rhythm.      Heart sounds: No murmur heard.  Pulmonary:      Effort: Pulmonary effort is normal.      Breath sounds: Normal breath sounds.   Abdominal:      General: Abdomen is flat. Bowel sounds are normal.      Palpations: Abdomen is soft.      Tenderness: There is no abdominal tenderness.   Musculoskeletal:         General: No deformity.      Cervical back: Normal range of motion and neck supple.      Right lower leg: No edema.      Left lower leg: No edema.   Lymphadenopathy:      Cervical: No cervical adenopathy.   Skin:     General: Skin is warm and dry.      Findings: No rash.   Neurological:      General: No focal deficit present.      Mental Status: He is alert and oriented to person, place, and time.      Deep Tendon Reflexes: Reflexes normal.   Psychiatric:         Mood and Affect: Mood normal.          Gaby Herring DO   [facility]

## 2024-04-05 ENCOUNTER — OFFICE VISIT (OUTPATIENT)
Dept: FAMILY MEDICINE CLINIC | Facility: CLINIC | Age: 34
End: 2024-04-05
Payer: COMMERCIAL

## 2024-04-05 VITALS
HEIGHT: 71 IN | OXYGEN SATURATION: 100 % | HEART RATE: 102 BPM | SYSTOLIC BLOOD PRESSURE: 130 MMHG | WEIGHT: 210.8 LBS | TEMPERATURE: 98.6 F | BODY MASS INDEX: 29.51 KG/M2 | DIASTOLIC BLOOD PRESSURE: 88 MMHG

## 2024-04-05 DIAGNOSIS — R03.0 ELEVATED BLOOD PRESSURE READING WITHOUT DIAGNOSIS OF HYPERTENSION: ICD-10-CM

## 2024-04-05 DIAGNOSIS — R73.01 IMPAIRED FASTING GLUCOSE: ICD-10-CM

## 2024-04-05 DIAGNOSIS — Z00.00 ANNUAL PHYSICAL EXAM: Primary | ICD-10-CM

## 2024-04-05 DIAGNOSIS — Z13.6 SCREENING FOR CARDIOVASCULAR CONDITION: ICD-10-CM

## 2024-04-05 DIAGNOSIS — I77.810 ASCENDING AORTA DILATATION (HCC): ICD-10-CM

## 2024-04-05 DIAGNOSIS — R74.01 TRANSAMINASEMIA: ICD-10-CM

## 2024-04-05 PROCEDURE — 99395 PREV VISIT EST AGE 18-39: CPT | Performed by: FAMILY MEDICINE

## 2024-04-13 LAB
ALBUMIN SERPL-MCNC: 4.6 G/DL (ref 4.1–5.1)
ALBUMIN/GLOB SERPL: 1.8 {RATIO} (ref 1.2–2.2)
ALP SERPL-CCNC: 50 IU/L (ref 44–121)
ALT SERPL-CCNC: 36 IU/L (ref 0–44)
AST SERPL-CCNC: 22 IU/L (ref 0–40)
BILIRUB SERPL-MCNC: 0.5 MG/DL (ref 0–1.2)
BUN SERPL-MCNC: 13 MG/DL (ref 6–20)
BUN/CREAT SERPL: 13 (ref 9–20)
CALCIUM SERPL-MCNC: 9.3 MG/DL (ref 8.7–10.2)
CHLORIDE SERPL-SCNC: 103 MMOL/L (ref 96–106)
CHOLEST SERPL-MCNC: 213 MG/DL (ref 100–199)
CHOLEST/HDLC SERPL: 5.3 RATIO (ref 0–5)
CO2 SERPL-SCNC: 22 MMOL/L (ref 20–29)
CREAT SERPL-MCNC: 0.97 MG/DL (ref 0.76–1.27)
EGFR: 104 ML/MIN/1.73
GLOBULIN SER-MCNC: 2.5 G/DL (ref 1.5–4.5)
GLUCOSE SERPL-MCNC: 102 MG/DL (ref 70–99)
HDLC SERPL-MCNC: 40 MG/DL
LDLC SERPL CALC-MCNC: 145 MG/DL (ref 0–99)
POTASSIUM SERPL-SCNC: 4.3 MMOL/L (ref 3.5–5.2)
PROT SERPL-MCNC: 7.1 G/DL (ref 6–8.5)
SL AMB VLDL CHOLESTEROL CALC: 28 MG/DL (ref 5–40)
SODIUM SERPL-SCNC: 142 MMOL/L (ref 134–144)
TRIGL SERPL-MCNC: 153 MG/DL (ref 0–149)

## 2024-05-23 ENCOUNTER — TELEPHONE (OUTPATIENT)
Dept: FAMILY MEDICINE CLINIC | Facility: CLINIC | Age: 34
End: 2024-05-23

## 2024-09-24 ENCOUNTER — OFFICE VISIT (OUTPATIENT)
Dept: FAMILY MEDICINE CLINIC | Facility: HOSPITAL | Age: 34
End: 2024-09-24
Payer: COMMERCIAL

## 2024-09-24 VITALS
WEIGHT: 216 LBS | HEART RATE: 85 BPM | DIASTOLIC BLOOD PRESSURE: 99 MMHG | SYSTOLIC BLOOD PRESSURE: 138 MMHG | TEMPERATURE: 98.1 F | BODY MASS INDEX: 30.24 KG/M2 | OXYGEN SATURATION: 98 % | HEIGHT: 71 IN

## 2024-09-24 DIAGNOSIS — R07.89 CHEST TIGHTNESS: Primary | ICD-10-CM

## 2024-09-24 DIAGNOSIS — M25.50 PAIN IN JOINT INVOLVING MULTIPLE SITES: ICD-10-CM

## 2024-09-24 PROCEDURE — 99214 OFFICE O/P EST MOD 30 MIN: CPT | Performed by: NURSE PRACTITIONER

## 2024-09-24 NOTE — PROGRESS NOTES
Ambulatory Visit  Name: Agustin BERNSTEIN Host      : 1990      MRN: [id number]  Encounter Provider: MAK Marvin  Encounter Date: 2024   Encounter department: [facility] [address]     Assessment & Plan  Chest tightness  At this point a cardiac and GI cause has been ruled out.   I do feel there is an inflammatory component to his symptoms.   It does not appear thyroid or RF was ordered so will obtain that along with plethora of other labs.   He does have family h/o lupus and is requesting lupus anticoagulant and and cardiolipin antibody.   Ultimately he may need referral to rheumatology  Orders:    CBC and differential; Future    Comprehensive metabolic panel; Future    TSH, 3rd generation; Future    T4, free; Future    T3, free; Future    TABATHA Comprehensive Panel; Future    Vitamin D 25 hydroxy; Future    Lupus anticoagulant; Future    Cardiolipin antibody; Future    Rheumatoid Arthritis Profile; Future    C-reactive protein; Future    Lyme Total AB W Reflex to IGM/IGG; Future    CBC and differential    Comprehensive metabolic panel    TSH, 3rd generation    T4, free    T3, free    TABATHA Comprehensive Panel    Vitamin D 25 hydroxy    Lupus anticoagulant    Cardiolipin antibody    Rheumatoid Arthritis Profile    C-reactive protein    Lyme Total AB W Reflex to IGM/IGG    Pain in joint involving multiple sites  As mentioned above I do feel there is an inflammatory component.   Check labs.   He will message me with past supplements to see if there is anything I can recommend he start to take again.     Orders:    CBC and differential; Future    Comprehensive metabolic panel; Future    TSH, 3rd generation; Future    T4, free; Future    T3, free; Future    TAABTHA Comprehensive Panel; Future    Vitamin D 25 hydroxy; Future    Lupus anticoagulant; Future    Cardiolipin antibody; Future    Rheumatoid Arthritis Profile; Future    C-reactive protein; Future    Lyme Total AB W Reflex to IGM/IGG;  "Future    CBC and differential    Comprehensive metabolic panel    TSH, 3rd generation    T4, free    T3, free    TABATHA Comprehensive Panel    Vitamin D 25 hydroxy    Lupus anticoagulant    Cardiolipin antibody    Rheumatoid Arthritis Profile    C-reactive protein    Lyme Total AB W Reflex to IGM/IGG       History of Present Illness     Having \"chest\" issues. Chest tightness that feels like a heart attacks. Has been seen in ER 4 times and w/u normal. Was told autoimmune and given steroid. Had relief the first 3-4 days then as he tapered and pain returned. Had a stress test, echo, CT chest. Had some minor dilation of aorta. Repeat showed no change. Also had EGD which was normal. Lupus on mom's side of the family. Chest tightness is left sided. Feels like a tightening like a strap  being pulled. Both shoulders will get tight and neck will get tight. Will get knee and toe pain. Also with some finger pain. Chest upward is where it bothers him the most. Has been getting bald spots in beard. Also with pain in left armpit. He did see functional medicine at one point and was on supplements and had no symptoms for 9 months. He does report that symptoms started after 2nd COVID vaccine.         History obtained from : patient  Review of Systems   Constitutional:  Negative for chills and fever.   Eyes:  Negative for visual disturbance.   Respiratory:  Positive for chest tightness.    Cardiovascular:  Negative for chest pain, palpitations and leg swelling.   Endocrine: Negative for cold intolerance and heat intolerance.   Musculoskeletal:  Positive for arthralgias, myalgias and neck pain. Negative for joint swelling.   Skin:  Negative for rash.        Hair loss   Neurological:  Negative for dizziness, weakness, light-headedness, numbness and headaches.           Objective     /99 (BP Location: Left arm, Patient Position: Sitting, Cuff Size: Standard)   Pulse 85   Temp 98.1 °F (36.7 °C) (Tympanic)   Ht 5' 11\" (1.803 m)   " Wt 98 kg (216 lb)   SpO2 98%   BMI 30.13 kg/m²     Physical Exam  Vitals reviewed.   Constitutional:       Appearance: Normal appearance.   Cardiovascular:      Rate and Rhythm: Normal rate and regular rhythm.      Heart sounds: Normal heart sounds. No murmur heard.  Pulmonary:      Effort: Pulmonary effort is normal.      Breath sounds: Normal breath sounds.   Lymphadenopathy:      Upper Body:      Right upper body: No axillary adenopathy.      Left upper body: No axillary adenopathy.   Skin:     General: Skin is warm and dry.   Neurological:      Mental Status: He is alert and oriented to person, place, and time.   Psychiatric:         Mood and Affect: Mood normal.         Behavior: Behavior normal.         Thought Content: Thought content normal.         Judgment: Judgment normal.       Administrative Statements   I have spent a total time of 30 minutes in caring for this patient on the day of the visit/encounter including Instructions for management, Risk factor reductions, Impressions, Documenting in the medical record, Reviewing / ordering tests, medicine, procedures  , and Obtaining or reviewing history  .

## 2024-09-25 LAB — B BURGDOR IGG+IGM SER QL IA: NEGATIVE

## 2024-09-26 LAB
25(OH)D3+25(OH)D2 SERPL-MCNC: 22.7 NG/ML (ref 30–100)
ALBUMIN SERPL-MCNC: 5 G/DL (ref 4.1–5.1)
ALP SERPL-CCNC: 53 IU/L (ref 44–121)
ALT SERPL-CCNC: 51 IU/L (ref 0–44)
APTT SCREEN TO CONFIRM RATIO: 1.06 RATIO (ref 0–1.34)
AST SERPL-CCNC: 26 IU/L (ref 0–40)
BASOPHILS # BLD AUTO: 0 X10E3/UL (ref 0–0.2)
BASOPHILS NFR BLD AUTO: 0 %
BILIRUB SERPL-MCNC: 0.9 MG/DL (ref 0–1.2)
BUN SERPL-MCNC: 16 MG/DL (ref 6–20)
BUN/CREAT SERPL: 17 (ref 9–20)
CALCIUM SERPL-MCNC: 10 MG/DL (ref 8.7–10.2)
CARDIOLIPIN IGA SER IA-ACNC: <9 APL U/ML (ref 0–11)
CARDIOLIPIN IGG SER IA-ACNC: <9 GPL U/ML (ref 0–14)
CARDIOLIPIN IGM SER IA-ACNC: <9 MPL U/ML (ref 0–12)
CCP IGA+IGG SERPL IA-ACNC: 8 UNITS (ref 0–19)
CENTROMERE B AB SER-ACNC: <0.2 AI (ref 0–0.9)
CHLORIDE SERPL-SCNC: 101 MMOL/L (ref 96–106)
CHROMATIN AB SERPL-ACNC: <0.2 AI (ref 0–0.9)
CO2 SERPL-SCNC: 20 MMOL/L (ref 20–29)
CONFIRM APTT/NORMAL: 34.6 SEC (ref 0–47.6)
CREAT SERPL-MCNC: 0.92 MG/DL (ref 0.76–1.27)
CRP SERPL-MCNC: <1 MG/L (ref 0–10)
DSDNA AB SER-ACNC: <1 IU/ML (ref 0–9)
EGFR: 111 ML/MIN/1.73
ENA JO1 AB SER-ACNC: <0.2 AI (ref 0–0.9)
ENA RNP AB SER-ACNC: <0.2 AI (ref 0–0.9)
ENA SCL70 AB SER-ACNC: <0.2 AI (ref 0–0.9)
ENA SM AB SER-ACNC: <0.2 AI (ref 0–0.9)
ENA SS-A AB SER-ACNC: <0.2 AI (ref 0–0.9)
ENA SS-B AB SER-ACNC: <0.2 AI (ref 0–0.9)
EOSINOPHIL # BLD AUTO: 0.1 X10E3/UL (ref 0–0.4)
EOSINOPHIL NFR BLD AUTO: 1 %
ERYTHROCYTE [DISTWIDTH] IN BLOOD BY AUTOMATED COUNT: 12.7 % (ref 11.6–15.4)
GLOBULIN SER-MCNC: 2.6 G/DL (ref 1.5–4.5)
GLUCOSE SERPL-MCNC: 99 MG/DL (ref 70–99)
HCT VFR BLD AUTO: 50.3 % (ref 37.5–51)
HGB BLD-MCNC: 17.3 G/DL (ref 13–17.7)
IMM GRANULOCYTES # BLD: 0 X10E3/UL (ref 0–0.1)
IMM GRANULOCYTES NFR BLD: 0 %
LA PPP-IMP: NORMAL
LYMPHOCYTES # BLD AUTO: 2 X10E3/UL (ref 0.7–3.1)
LYMPHOCYTES NFR BLD AUTO: 28 %
MCH RBC QN AUTO: 30.8 PG (ref 26.6–33)
MCHC RBC AUTO-ENTMCNC: 34.4 G/DL (ref 31.5–35.7)
MCV RBC AUTO: 90 FL (ref 79–97)
MONOCYTES # BLD AUTO: 0.5 X10E3/UL (ref 0.1–0.9)
MONOCYTES NFR BLD AUTO: 7 %
NEUTROPHILS # BLD AUTO: 4.4 X10E3/UL (ref 1.4–7)
NEUTROPHILS NFR BLD AUTO: 64 %
PLATELET # BLD AUTO: 247 X10E3/UL (ref 150–450)
POTASSIUM SERPL-SCNC: 4.2 MMOL/L (ref 3.5–5.2)
PROT SERPL-MCNC: 7.6 G/DL (ref 6–8.5)
RBC # BLD AUTO: 5.62 X10E6/UL (ref 4.14–5.8)
RHEUMATOID FACT SERPL-ACNC: <10 IU/ML
SCREEN APTT: 30.5 SEC (ref 0–43.5)
SCREEN DRVVT: 32.2 SEC (ref 0–47)
SL AMB SEE BELOW:: NORMAL
SODIUM SERPL-SCNC: 139 MMOL/L (ref 134–144)
T3FREE SERPL-MCNC: 3.5 PG/ML (ref 2–4.4)
T4 FREE SERPL-MCNC: 1.27 NG/DL (ref 0.82–1.77)
THROMBIN TIME: 17.9 SEC (ref 0–23)
TSH SERPL DL<=0.005 MIU/L-ACNC: 1.83 UIU/ML (ref 0.45–4.5)
WBC # BLD AUTO: 7 X10E3/UL (ref 3.4–10.8)

## 2024-10-02 DIAGNOSIS — M25.50 PAIN IN JOINT INVOLVING MULTIPLE SITES: Primary | ICD-10-CM

## 2024-11-14 NOTE — PROGRESS NOTES
Name: Agustin Antoine      : 1990      MRN: [id number]  Encounter Provider: Fely Cadena DO  Encounter Date: 11/15/2024   Encounter department: [facility]     Reason for Consultation: Episodes of arthralgias and myalgias:  Assessment & Plan  Myalgia  Patient is a 33-year-old male presenting with sporadic episodes of feeling tightness in his chest, shoulder blades associated with diaphoresis and pain in hands and feet.  Reports ankle swelling on certain occasions during these episodes.  Also reports shortness of breath during these episodes.  Episodes are sporadic with no clear pattern or trigger.  Denies any associated fevers or rashes.  Reports that sometimes his arm feels heavy but no true weakness.  No synovitis or joint tenderness on exam today.  Patient has undergone extensive autoimmune testing including TABATHA, dsDNA, RNP, Guerra, anti-Jo1, SCL 70, SSA, SSB, anti-CCP, RF which were all negative.  Patient does have a history of recurrent sinus infections, will screen for vasculitis.  Will obtain ANCA studies, complements and urine studies.  No objective weakness on exam, however given myalgias we will evaluate for myositis.  Less likely to be auto inflammatory syndromes given patient's age and lack of fevers.  Advised patient to monitor closely if developing fevers during these episodes.  Relapsing polychondritis can be a consideration if there is involvement of the trachea.  Will obtain PFTs studies and consider obtaining images of the chest if needed.   -Complete serologic workup as below.  Advised patient to try to get labs when he has a flare to see if inflammatory markers are elevated  -Obtain PFTs  -Based on PFT results, can consider CT of the chest with both inspiratory and expiratory views to assess for any collapse of the trachea  -Advised patient to see if he has a fever during flareups  Orders:    MyoMarker 3 Plus Profile (RDL); Future    CK; Future    Sedimentation  rate, automated; Future    C-reactive protein; Future    C4 complement; Future    C3 complement; Future    Protein / creatinine ratio, urine; Future    Urinalysis with microscopic; Future    CBC and differential; Future    Comprehensive metabolic panel; Future    Ferritin; Future    ANCA Screen With MPO and PR3 With Reflex To ANCA Titer; Future    Pain in joint involving multiple sites  As above  Orders:    Ambulatory Referral to Rheumatology    ANCA Screen With MPO and PR3 With Reflex To ANCA Titer; Future    HLA-B27 antigen; Future    Shortness of breath  As above   Orders:    Complete PFT with post bronchodilator; Future      RTC in 2 months    Pertinent Medical History   Reviewed      History of Present Illness   HPI  Agustin Antoine is a 33 y.o. male with a history if ascending aorta dilation, fatty liver disease who presents for further evaluation of joint pain.    Patient reports that about 3 years ago he started having these episodes where he feels like he is having a heart attack.  Reports that the episodes are sporadic in nature.  Denies any clear trigger.  The episodes are unpredictable.  There is no clear set pattern to when they occur.  Patient states that the episodes last anywhere from 3 days to 14 days.  During an episode, patient reports that his hands, feet and ankle get really cold and sweaty.  Reports occasional swelling of the left ankle.  Reports occasional pain in the PIP joints.  Patient states that the most significant discomfort is located across his chest and by his shoulder blades.  Reports that he feels very tight.  Patient also reports getting lightheaded with tingling in his feet.  Patient states that he has gone to the ER multiple times due to the symptoms and cardiac workup including stress test and TTE were negative.  Patient states that at 1 point he was diagnosed with a panic attack and he was started on medications, however reports he continued to have symptoms while taking the  medication.  Patient states that there will be times where he goes several months without experiencing these symptoms.    One of his ER visits, possible autoimmune etiology was discussed.  Patient states that he was given a prednisone taper which helped initially however the symptoms returned when he started taking lower doses.  Patient states that he was evaluated by a rheumatologist and all the workup came back negative.  His symptoms or not thought to be due to an autoimmune cause at that time.    Following this, patient went to a holistic doctor and was prescribed multiple supplements.  Patient states that this helped significantly.  Reports he was not having flareups while taking the medicine.  Patient states that he has alopecia of his beard and that also started to improve.  However, the medications are costly and patient was no longer able to afford them.  Patient states that after 6 months of stopping the supplements his symptoms started to return.    Last episode was 3 days ago.  Patient reports feeling fine today.    Patient denies any associated fevers.  No rashes.    Patient does report feeling short of breath during these episodes.  Sometimes feels like he cannot take a deep breath in.    Patient reports that he gets frequent sinus infections 2-3 times a year.  Patient reports he always had bad ear infections as a kid.    Family history: Aunt with SLE, Dad with PsO--patient states that he was diagnosed with arthritis in his 30s however unsure if he has psoriatic arthritis    Review of Systems  Complete ROS conducted as per HPI. In addition, denies:  Fever  Photosensitive rash  Sicca symptoms  Recurrent oral ulcers  Ear swelling  Nose swelling  Uveitis  Dactylitis  Gross hematuria  Foamy urine  Raynaud's  Joint issues other than noted above    Past Medical History   Past Medical History:   Diagnosis Date    Anxiety disorder 10/17/2012    Chest pain     seen in ED at [place] 10/2022; CTA head and neck  negative. troponins negative. EKG normal. nuclear stress test 11/2022 negative    COVID-19 01/06/2022    Depression 10/17/2012    situational. around mother's death    Dizziness 10/18/2022    ED visit to [place] for chest pain, diaphoresis and dizziness. CTA head neck negative. CXR nl. ekg normal. d-dimer -; troponins -    Fatty liver     Gastroesophageal reflux disease 11/04/2022    History of hepatitis C     Kidney stone     Substance abuse (HCC)     clean since 2015; pain killers and heroin    Umbilical pain      Past Surgical History:   Procedure Laterality Date    CYSTOSCOPY W/ URETERAL STENT PLACEMENT  2012    in [facility]    CYSTOSCOPY W/ URETEROSCOPY W/ LITHOTRIPSY Left 08/18/2022    dr gutierrez    EGD  11/17/2022    normal esophagus. erythematous mucosa in the antrum. normal mucosa in fundus    FL RETROGRADE PYELOGRAM  08/18/2022    HERNIA REPAIR      OR CYSTO/URETERO W/LITHOTRIPSY &INDWELL STENT INSRT Left 08/18/2022    Procedure: CYSTOSCOPY URETEROSCOPY WITH LITHOTRIPSY HOLMIUM LASER, RETROGRADE PYELOGRAM AND INSERTION STENT URETERAL;  Surgeon: Stu Gutierrez MD;  Location: [facility];  Service: Urology    OR RPR UMBILICAL HRNA 5 YRS/> REDUCIBLE N/A 02/23/2022    Procedure: REPAIR HERNIA UMBILICAL OPEN;  Surgeon: Andrew Jimenez MD;  Location: [facility];  Service: General    WRIST FRACTURE SURGERY Right      Family History   Problem Relation Age of Onset    Leukemia Mother     Depression Father     Lupus Maternal Aunt     Lupus Cousin     Substance Abuse Neg Hx     Alcohol abuse Neg Hx       reports that he quit smoking about 8 years ago. His smoking use included cigarettes. He started smoking about 18 years ago. He has a 10 pack-year smoking history. He has never used smokeless tobacco. He reports that he does not currently use alcohol. He reports that he does not currently use drugs after having used the following drugs: Heroin and Oxycodone.  Current Outpatient Medications on File Prior to Visit  "  Medication Sig Dispense Refill    fluticasone (FLONASE) 50 mcg/act nasal spray 1 spray into each nostril as needed        ibuprofen (MOTRIN) 200 mg tablet Take 400 mg by mouth daily as needed for mild pain       No current facility-administered medications on file prior to visit.   No Known Allergies   Current Outpatient Medications on File Prior to Visit   Medication Sig Dispense Refill    fluticasone (FLONASE) 50 mcg/act nasal spray 1 spray into each nostril as needed        ibuprofen (MOTRIN) 200 mg tablet Take 400 mg by mouth daily as needed for mild pain       No current facility-administered medications on file prior to visit.      Social History     Tobacco Use    Smoking status: Former     Current packs/day: 0.00     Average packs/day: 1 pack/day for 10.0 years (10.0 ttl pk-yrs)     Types: Cigarettes     Start date:      Quit date: 2016     Years since quittin.8    Smokeless tobacco: Never   Vaping Use    Vaping status: Never Used   Substance and Sexual Activity    Alcohol use: Not Currently    Drug use: Not Currently     Types: Heroin, Oxycodone     Comment: clean since ; pain killers and heroin    Sexual activity: Yes     Partners: Female         Objective   /82 (BP Location: Right arm, Patient Position: Sitting, Cuff Size: Adult)   Pulse 85   Temp 98.6 °F (37 °C) (Tympanic)   Ht 5' 11\" (1.803 m)   Wt 97.3 kg (214 lb 9.6 oz)   SpO2 98%   BMI 29.93 kg/m²     Physical Exam  General appearance: normal appearing, no acute distress  Skin: normal, no rashes  HEENT: normal, moist oropharynx, no nasal or oral ulcers  Lymph nodes: no palpable adenopathy  Lungs: normal respiratory effort, comfortable on room air, lungs clear to auscultation b/l   Heart: normal heart sounds, normal rate, normal rhythm,  Abdomen: soft, normal bowel sounds, no tenderness  Neurologic: no obvious neurological deficits   Extremities: no edema, warm and well perfused     Musculoskeletal Exam:   - Observation: no " obvious joint abnormalities    - Palpation: no joint tenderness  - Synovitis: absent  - Joint effusions: absent  - ROM: intact throughout  - Muscle Strength: 5/5 throughout     Recent labs:  Lab Results   Component Value Date/Time    SODIUM 139 09/24/2024 10:33 AM    K 4.2 09/24/2024 10:33 AM    BUN 16 09/24/2024 10:33 AM    CREATININE 0.92 09/24/2024 10:33 AM    CREATININE 1.05 08/19/2022 11:02 PM    CREATININE 0.86 01/12/2015 08:00 PM    GLUC 99 09/24/2024 10:33 AM    CALCIUM 8.5 08/19/2022 11:02 PM    CALCIUM 9.5 01/12/2015 08:00 PM    AST 26 09/24/2024 10:33 AM    ALT 51 (H) 09/24/2024 10:33 AM    ALB 5.0 09/24/2024 10:33 AM    ALB 4.1 01/12/2015 08:00 PM    TP 7.6 09/24/2024 10:33 AM    EGFR 111 09/24/2024 10:33 AM    EGFR 92 08/19/2022 11:02 PM     Lab Results   Component Value Date/Time    HGB 17.3 09/24/2024 10:33 AM    HGB 15.6 08/19/2022 11:02 PM    HGB 15.4 01/12/2015 08:00 PM    WBC 7.0 09/24/2024 10:33 AM    WBC 14.13 (H) 08/19/2022 11:02 PM    WBC 9.74 01/12/2015 08:00 PM     09/24/2024 10:33 AM     08/19/2022 11:02 PM     01/12/2015 08:00 PM    INR 1.0 03/29/2021 11:09 AM     TABATHA by IFA negative   dsDNA negative  RNP negative  Guerra negative  Anti-Jo1 negative  SCL 70 negative  Anticentromere negative  SSA negative  SSB negative  Anti-CCP negative  RF negative    I have personally reviewed notes, labs, and imaging available in the chart.       Fely Cadena DO, CCD, [facility]

## 2024-11-15 ENCOUNTER — CONSULT (OUTPATIENT)
Age: 34
End: 2024-11-15

## 2024-11-15 VITALS
OXYGEN SATURATION: 98 % | WEIGHT: 214.6 LBS | HEART RATE: 85 BPM | HEIGHT: 71 IN | SYSTOLIC BLOOD PRESSURE: 134 MMHG | BODY MASS INDEX: 30.04 KG/M2 | DIASTOLIC BLOOD PRESSURE: 82 MMHG | TEMPERATURE: 98.6 F

## 2024-11-15 DIAGNOSIS — M25.50 PAIN IN JOINT INVOLVING MULTIPLE SITES: ICD-10-CM

## 2024-11-15 DIAGNOSIS — M79.10 MYALGIA: Primary | ICD-10-CM

## 2024-11-15 DIAGNOSIS — R06.02 SHORTNESS OF BREATH: ICD-10-CM

## (undated) DEVICE — TUBING SUCTION 5MM X 12 FT

## (undated) DEVICE — INTENDED FOR TISSUE SEPARATION, AND OTHER PROCEDURES THAT REQUIRE A SHARP SURGICAL BLADE TO PUNCTURE OR CUT.: Brand: BARD-PARKER SAFETY BLADES SIZE 15, STERILE

## (undated) DEVICE — GLOVE SRG BIOGEL 6.5

## (undated) DEVICE — CHLORAPREP HI-LITE 26ML ORANGE

## (undated) DEVICE — BASKET SPECIMEN RETRIVAL 1.9FR 120CM

## (undated) DEVICE — SHEATH URETERAL ACCESS 12/14FR 35CM PROXIS

## (undated) DEVICE — URETEROSCOPE DIGITAL FLEXIBLE RVS DEFLECTION SNGL USE WISCOPE

## (undated) DEVICE — 3M™ TEGADERM™ TRANSPARENT FILM DRESSING FRAME STYLE, 1624W, 2-3/8 IN X 2-3/4 IN (6 CM X 7 CM), 100/CT 4CT/CASE: Brand: 3M™ TEGADERM™

## (undated) DEVICE — CHLORHEXIDINE 4PCT 4 OZ

## (undated) DEVICE — UROCATCH BAG

## (undated) DEVICE — PREMIUM DRY TRAY LF: Brand: MEDLINE INDUSTRIES, INC.

## (undated) DEVICE — STERILE SURGICAL LUBRICANT,  TUBE: Brand: SURGILUBE

## (undated) DEVICE — FIBER STD QUANTA 272 MICRON

## (undated) DEVICE — GLOVE SRG BIOGEL ECLIPSE 8

## (undated) DEVICE — SUT MONOCRYL 4-0 PS-2 27 IN Y426H

## (undated) DEVICE — MEDI-VAC YANKAUER SUCTION HANDLE W/BULBOUS AND CONTROL VENT: Brand: CARDINAL HEALTH

## (undated) DEVICE — NEEDLE HYPO 22G X 1-1/2 IN

## (undated) DEVICE — CATH URETERAL 5FR X 70 CM FLEX TIP POLYUR BARD

## (undated) DEVICE — ADHESIVE SKIN HIGH VISCOSITY EXOFIN 1ML

## (undated) DEVICE — ELECTRODE BLADE MOD E-Z CLEAN  2.75IN 7CM -0012AM

## (undated) DEVICE — SHEATH URETERAL ACCESS 12/14FR 45CM PROXIS

## (undated) DEVICE — PENCIL ELECTROSURG E-Z CLEAN -0035H

## (undated) DEVICE — DRAPE EQUIPMENT RF WAND

## (undated) DEVICE — SUT VICRYL 3-0 SH 27 IN J416H

## (undated) DEVICE — GUIDEWIRE STRGHT TIP 0.035 IN  SOLO PLUS

## (undated) DEVICE — ENDOSCOPIC VALVE WITH ADAPTER.: Brand: SURSEAL® II

## (undated) DEVICE — GLOVE INDICATOR PI UNDERGLOVE SZ 8 BLUE

## (undated) DEVICE — PACK TUR

## (undated) DEVICE — BETHLEHEM UNIVERSAL MINOR GEN: Brand: CARDINAL HEALTH

## (undated) DEVICE — INVIEW CLEAR LEGGINGS: Brand: CONVERTORS

## (undated) DEVICE — GLOVE INDICATOR PI UNDERGLOVE SZ 6.5 BLUE

## (undated) DEVICE — SPECIMEN CONTAINER STERILE PEEL PACK

## (undated) DEVICE — GLOVE SRG BIOGEL 7.5

## (undated) DEVICE — SUT PROLENE 2-0 CT-2 30 IN 8411H